# Patient Record
Sex: FEMALE | Race: WHITE | NOT HISPANIC OR LATINO | Employment: FULL TIME
[De-identification: names, ages, dates, MRNs, and addresses within clinical notes are randomized per-mention and may not be internally consistent; named-entity substitution may affect disease eponyms.]

---

## 2017-06-03 ENCOUNTER — HEALTH MAINTENANCE LETTER (OUTPATIENT)
Age: 57
End: 2017-06-03

## 2017-08-29 ENCOUNTER — OFFICE VISIT (OUTPATIENT)
Dept: FAMILY MEDICINE | Facility: CLINIC | Age: 57
End: 2017-08-29
Payer: COMMERCIAL

## 2017-08-29 VITALS
HEIGHT: 67 IN | BODY MASS INDEX: 22.29 KG/M2 | DIASTOLIC BLOOD PRESSURE: 79 MMHG | TEMPERATURE: 98.2 F | WEIGHT: 142 LBS | SYSTOLIC BLOOD PRESSURE: 116 MMHG | HEART RATE: 74 BPM | OXYGEN SATURATION: 97 %

## 2017-08-29 DIAGNOSIS — Z00.00 ROUTINE GENERAL MEDICAL EXAMINATION AT A HEALTH CARE FACILITY: Primary | ICD-10-CM

## 2017-08-29 DIAGNOSIS — Z12.31 ENCOUNTER FOR SCREENING MAMMOGRAM FOR BREAST CANCER: ICD-10-CM

## 2017-08-29 DIAGNOSIS — Z12.11 SPECIAL SCREENING FOR MALIGNANT NEOPLASMS, COLON: ICD-10-CM

## 2017-08-29 DIAGNOSIS — E78.5 HYPERLIPIDEMIA LDL GOAL <130: ICD-10-CM

## 2017-08-29 DIAGNOSIS — Z11.59 ENCOUNTER FOR HEPATITIS C SCREENING TEST FOR LOW RISK PATIENT: ICD-10-CM

## 2017-08-29 LAB
ALBUMIN SERPL-MCNC: 3.9 G/DL (ref 3.4–5)
ALP SERPL-CCNC: 52 U/L (ref 40–150)
ALT SERPL W P-5'-P-CCNC: 21 U/L (ref 0–50)
ANION GAP SERPL CALCULATED.3IONS-SCNC: 9 MMOL/L (ref 3–14)
AST SERPL W P-5'-P-CCNC: 14 U/L (ref 0–45)
BILIRUB SERPL-MCNC: 0.2 MG/DL (ref 0.2–1.3)
BUN SERPL-MCNC: 25 MG/DL (ref 7–30)
CALCIUM SERPL-MCNC: 9.8 MG/DL (ref 8.5–10.1)
CHLORIDE SERPL-SCNC: 110 MMOL/L (ref 94–109)
CHOLEST SERPL-MCNC: 231 MG/DL
CO2 SERPL-SCNC: 25 MMOL/L (ref 20–32)
CREAT SERPL-MCNC: 0.72 MG/DL (ref 0.52–1.04)
ERYTHROCYTE [DISTWIDTH] IN BLOOD BY AUTOMATED COUNT: 12.1 % (ref 10–15)
GFR SERPL CREATININE-BSD FRML MDRD: 84 ML/MIN/1.7M2
GLUCOSE SERPL-MCNC: 97 MG/DL (ref 70–99)
HCT VFR BLD AUTO: 40.5 % (ref 35–47)
HDLC SERPL-MCNC: 62 MG/DL
HGB BLD-MCNC: 13.6 G/DL (ref 11.7–15.7)
LDLC SERPL CALC-MCNC: 161 MG/DL
MCH RBC QN AUTO: 31 PG (ref 26.5–33)
MCHC RBC AUTO-ENTMCNC: 33.6 G/DL (ref 31.5–36.5)
MCV RBC AUTO: 92 FL (ref 78–100)
NONHDLC SERPL-MCNC: 169 MG/DL
PLATELET # BLD AUTO: 232 10E9/L (ref 150–450)
POTASSIUM SERPL-SCNC: 4 MMOL/L (ref 3.4–5.3)
PROT SERPL-MCNC: 6.6 G/DL (ref 6.8–8.8)
RBC # BLD AUTO: 4.39 10E12/L (ref 3.8–5.2)
SODIUM SERPL-SCNC: 144 MMOL/L (ref 133–144)
TRIGL SERPL-MCNC: 40 MG/DL
WBC # BLD AUTO: 4.1 10E9/L (ref 4–11)

## 2017-08-29 PROCEDURE — 85027 COMPLETE CBC AUTOMATED: CPT | Performed by: PHYSICIAN ASSISTANT

## 2017-08-29 PROCEDURE — 80061 LIPID PANEL: CPT | Performed by: PHYSICIAN ASSISTANT

## 2017-08-29 PROCEDURE — 86803 HEPATITIS C AB TEST: CPT | Performed by: PHYSICIAN ASSISTANT

## 2017-08-29 PROCEDURE — 36415 COLL VENOUS BLD VENIPUNCTURE: CPT | Performed by: PHYSICIAN ASSISTANT

## 2017-08-29 PROCEDURE — 80053 COMPREHEN METABOLIC PANEL: CPT | Performed by: PHYSICIAN ASSISTANT

## 2017-08-29 PROCEDURE — 99396 PREV VISIT EST AGE 40-64: CPT | Performed by: PHYSICIAN ASSISTANT

## 2017-08-29 NOTE — MR AVS SNAPSHOT
After Visit Summary   8/29/2017    Henrietta Conner    MRN: [id number]           Patient Information     Date Of Birth          1960        Visit Information        Provider Department      8/29/2017 9:00 AM Tati Oakley PA-C [facility]        Today's Diagnoses     Routine general medical examination at a health care facility    -  1    Hyperlipidemia LDL goal <130        Encounter for hepatitis C screening test for low risk patient        Encounter for screening mammogram for breast cancer        Special screening for malignant neoplasms, colon          Care Instructions      Preventive Health Recommendations  Female Ages 50 - 64    Yearly exam: See your health care provider every year in order to  o Review health changes.   o Discuss preventive care.    o Review your medicines if your doctor has prescribed any.      Get a Pap test every three years (unless you have an abnormal result and your provider advises testing more often).    If you get Pap tests with HPV test, you only need to test every 5 years, unless you have an abnormal result.     You do not need a Pap test if your uterus was removed (hysterectomy) and you have not had cancer.    You should be tested each year for STDs (sexually transmitted diseases) if you're at risk.     Have a mammogram every 1 to 2 years.    Have a colonoscopy at age 50, or have a yearly FIT test (stool test). These exams screen for colon cancer.      Have a cholesterol test every 5 years, or more often if advised.    Have a diabetes test (fasting glucose) every three years. If you are at risk for diabetes, you should have this test more often.     If you are at risk for osteoporosis (brittle bone disease), think about having a bone density scan (DEXA).    Shots: Get a flu shot each year. Get a tetanus shot every 10 years.    Nutrition:     Eat at least 5 servings of fruits and vegetables each day.    Eat whole-grain bread,  "whole-wheat pasta and brown rice instead of white grains and rice.    Talk to your provider about Calcium and Vitamin D.     Lifestyle    Exercise at least 150 minutes a week (30 minutes a day, 5 days a week). This will help you control your weight and prevent disease.    Limit alcohol to one drink per day.    No smoking.     Wear sunscreen to prevent skin cancer.     See your dentist every six months for an exam and cleaning.    See your eye doctor every 1 to 2 years.            Follow-ups after your visit        Who to contact     If you have questions or need follow up information about today's clinic visit or your schedule please contact [facility] directly at [phone].  Normal or non-critical lab and imaging results will be communicated to you by [employer], letter or phone within 4 business days after the clinic has received the results. If you do not hear from us within 7 days, please contact the clinic through [employer] or phone. If you have a critical or abnormal lab result, we will notify you by phone as soon as possible.  Submit refill requests through [employer] or call your pharmacy and they will forward the refill request to us. Please allow 3 business days for your refill to be completed.          Additional Information About Your Visit        MyChart Information     [employer] gives you secure access to your electronic health record. If you see a primary care provider, you can also send messages to your care team and make appointments. If you have questions, please call your primary care clinic.  If you do not have a primary care provider, please call [phone] and they will assist you.        Care EveryWhere ID     This is your Care EveryWhere ID. This could be used by other organizations to access your [facility] medical records  [id number]        Your Vitals Were     Pulse Temperature Height Last Period Pulse Oximetry Breastfeeding?    74 98.2  F (36.8  C) (Oral) 5' 6.5\" (1.689 m) " 06/01/2006 97% No    BMI (Body Mass Index)                   22.58 kg/m2            Blood Pressure from Last 3 Encounters:   08/29/17 116/79   09/02/15 108/68   08/13/14 101/64    Weight from Last 3 Encounters:   08/29/17 142 lb (64.4 kg)   09/02/15 135 lb (61.2 kg)   08/13/14 137 lb (62.1 kg)              Today, you had the following     No orders found for display         Today's Medication Changes          These changes are accurate as of: 8/29/17  9:10 AM.  If you have any questions, ask your nurse or doctor.               Stop taking these medicines if you haven't already. Please contact your care team if you have questions.     cetirizine 10 MG tablet   Commonly known as:  zyrTEC   Stopped by:  Tati Oakley PA-C                    Primary Care Provider Office Phone # Fax #    Tati Oakley PA-C [phone] [address] [facility] [address]        Equal Access to Services     NURA GAONA AH: Hadii yumi morrisono Soomaali, waaxda luqadaha, qaybta kaalmada adeegyada, sandy larios . So [facility] [phone].    ATENCIÓN: Si habla español, tiene a gustafson disposición servicios gratuitos de asistencia lingüística. Llame al [phone].    We comply with applicable federal civil rights laws and [place] laws. We do not discriminate on the basis of race, color, national origin, age, disability sex, sexual orientation or gender identity.            Thank you!     Thank you for choosing [facility]  for your care. Our goal is always to provide you with excellent care. Hearing back from our patients is one way we can continue to improve our services. Please take a few minutes to complete the written survey that you may receive in the mail after your visit with us. Thank you!             Your Updated Medication List - Protect others around you: Learn how to safely use, store and throw away your medicines at www.disposemymeds.org.           This list is accurate as of: 8/29/17  9:10 AM.  Always use your most recent med list.                   Brand Name Dispense Instructions for use Diagnosis    BENADRYL PO      Take 25-50 mg by mouth daily as needed.        BIOTIN PO           EXCEDRIN PO      Take by mouth as needed        fish oil-omega-3 fatty acids 1000 MG capsule      1 capsule daily        GLUCOSAMINE PO      2 tablets daily        MAGNESIUM OXIDE PO           Multi-vitamin Tabs tablet   Generic drug:  multivitamin, therapeutic with minerals      1 tablet daily        TURMERIC PO           VITAMIN D (CHOLECALCIFEROL) PO      Take by mouth daily

## 2017-08-29 NOTE — PROGRESS NOTES
SUBJECTIVE:   CC: Henrietta Conner is an 57 year old woman who presents for preventive health visit.     Healthy Habits:    Do you get at least three servings of calcium containing foods daily (dairy, green leafy vegetables, etc.)? yes    Amount of exercise or daily activities, outside of work: 8,000 steps a day    Problems taking medications regularly No    Medication side effects: No    Have you had an eye exam in the past two years? yes    Do you see a dentist twice per year? yes    Do you have sleep apnea, excessive snoring or daytime drowsiness?no              Today's PHQ-2 Score:   PHQ-2 ( 1999 [employer]) 8/29/2017 9/2/2015   Q1: Little interest or pleasure in doing things 0 0   Q2: Feeling down, depressed or hopeless 0 0   PHQ-2 Score 0 0         Abuse: Current or Past(Physical, Sexual or Emotional)- No  Do you feel safe in your environment - Yes  Social History   Substance Use Topics     Smoking status: Former Smoker     Packs/day: 1.00     Years: 30.00     Types: Cigarettes     Smokeless tobacco: Never Used     Alcohol use No     The patient does not drink >3 drinks per day nor >7 drinks per week.    Reviewed orders with patient.  Reviewed health maintenance and updated orders accordingly - Yes  Labs reviewed in [facility]    Patient over age 50, mutual decision to screen reflected in health maintenance.      Pertinent mammograms are reviewed under the imaging tab.  History of abnormal Pap smear: Status post benign hysterectomy. Health Maintenance and Surgical History updated.    Reviewed and updated as needed this visit by clinical staffTobacco  Allergies  Med Hx  Surg Hx  Fam Hx  Soc Hx        Reviewed and updated as needed this visit by Provider        Past Medical History:   Diagnosis Date     Endometriosis of pelvic peritoneum         ROS:  C: NEGATIVE for fever, chills, change in weight  I: NEGATIVE for worrisome rashes, moles or lesions  E: NEGATIVE for vision changes or irritation  ENT: NEGATIVE  for ear, mouth and throat problems  R: NEGATIVE for significant cough or SOB  B: NEGATIVE for masses, tenderness or discharge  CV: NEGATIVE for chest pain, palpitations or peripheral edema  GI: NEGATIVE for nausea, abdominal pain, heartburn, or change in bowel habits  : NEGATIVE for unusual urinary or vaginal symptoms. No vaginal bleeding.  M: NEGATIVE for significant arthralgias or myalgia  N: NEGATIVE for weakness, dizziness or paresthesias  P: NEGATIVE for changes in mood or affect     OBJECTIVE:   LMP 06/01/2006  EXAM:  GENERAL APPEARANCE: healthy, alert and no distress  EYES: Eyes grossly normal to inspection, PERRL and conjunctivae and sclerae normal  HENT: ear canals and TM's normal, nose and mouth without ulcers or lesions, oropharynx clear and oral mucous membranes moist  NECK: no adenopathy, no asymmetry, masses, or scars and thyroid normal to palpation  RESP: lungs clear to auscultation - no rales, rhonchi or wheezes  BREAST: normal without masses, tenderness or nipple discharge and no palpable axillary masses or adenopathy  CV: regular rate and rhythm, normal S1 S2, no S3 or S4, no murmur, click or rub, no peripheral edema and peripheral pulses strong  ABDOMEN: soft, nontender, no hepatosplenomegaly, no masses and bowel sounds normal  MS: no musculoskeletal defects are noted and gait is age appropriate without ataxia  SKIN: no suspicious lesions or rashes  NEURO: Normal strength and tone, sensory exam grossly normal, mentation intact and speech normal  PSYCH: mentation appears normal and affect normal/bright    ASSESSMENT/PLAN:   1. Routine general medical examination at a health care facility    - CBC with platelets  - Comprehensive metabolic panel  - Lipid panel reflex to direct LDL    2. Hyperlipidemia LDL goal <130    - Lipid panel reflex to direct LDL    3. Encounter for hepatitis C screening test for low risk patient    - Hepatitis C Screen Reflex to HCV RNA Quant and Genotype    4. Encounter for  "screening mammogram for breast cancer    - Fecal colorectal cancer screen (FIT); Future    5. Special screening for malignant neoplasms, colon    - *MA Screening Digital Bilateral; Future    COUNSELING:   Reviewed preventive health counseling, as reflected in patient instructions       Regular exercise       Healthy diet/nutrition       Consider Hep C screening for patients born between 1945 and 1965         reports that she has quit smoking. Her smoking use included Cigarettes. She has a 30.00 pack-year smoking history. She has never used smokeless tobacco.    Estimated body mass index is 21.46 kg/(m^2) as calculated from the following:    Height as of 9/2/15: 5' 6.5\" (1.689 m).    Weight as of 9/2/15: 135 lb (61.2 kg).   Weight management plan noted, stable and monitoring    Counseling Resources:  ATP IV Guidelines  Pooled Cohorts Equation Calculator  Breast Cancer Risk Calculator  FRAX Risk Assessment  ICSI Preventive Guidelines  Dietary Guidelines for Americans, 2010  USDA's MyPlate  ASA Prophylaxis  Lung CA Screening    Tati Oakley PA-C, PAEmelinaC  [facility]"

## 2017-08-30 LAB — HCV AB SERPL QL IA: NONREACTIVE

## 2017-10-03 PROCEDURE — 82274 ASSAY TEST FOR BLOOD FECAL: CPT | Performed by: PHYSICIAN ASSISTANT

## 2017-10-05 DIAGNOSIS — Z12.31 ENCOUNTER FOR SCREENING MAMMOGRAM FOR BREAST CANCER: ICD-10-CM

## 2017-10-05 LAB — HEMOCCULT STL QL IA: NEGATIVE

## 2017-11-30 ENCOUNTER — TELEPHONE (OUTPATIENT)
Dept: FAMILY MEDICINE | Facility: CLINIC | Age: 57
End: 2017-11-30

## 2017-11-30 NOTE — TELEPHONE ENCOUNTER
"11/30/2017    Patient Communication Preferences indicate  Do not contact  and/or communication by \"Phone\" is not preferred. Call not required per Outreach team.      Outreach ,  Opal Iyer     "

## 2018-04-10 ENCOUNTER — TELEPHONE (OUTPATIENT)
Dept: FAMILY MEDICINE | Facility: CLINIC | Age: 58
End: 2018-04-10

## 2018-04-10 NOTE — TELEPHONE ENCOUNTER
Panel Management Review          Composite cancer screening  Chart review shows that this patient is due/due soon for the following Mammogram  Summary:    Patient is due/failing the following:   MAMMOGRAM    Action needed:   Patient needs referral/order: Needs an appointment for a mammogram    Type of outreach:    Phone, spoke to patient.  Appointment for mammogram scheduled for 4/20/18.    Questions for provider review:    None                                                                                                                                    Rneu Flores M.A.

## 2018-04-20 ENCOUNTER — RADIANT APPOINTMENT (OUTPATIENT)
Dept: MAMMOGRAPHY | Facility: CLINIC | Age: 58
End: 2018-04-20
Payer: COMMERCIAL

## 2018-04-20 DIAGNOSIS — Z12.11 SPECIAL SCREENING FOR MALIGNANT NEOPLASMS, COLON: ICD-10-CM

## 2018-04-20 PROCEDURE — 77067 SCR MAMMO BI INCL CAD: CPT | Mod: TC

## 2018-10-23 ENCOUNTER — OFFICE VISIT (OUTPATIENT)
Dept: FAMILY MEDICINE | Facility: CLINIC | Age: 58
End: 2018-10-23
Payer: COMMERCIAL

## 2018-10-23 VITALS
HEIGHT: 67 IN | HEART RATE: 70 BPM | RESPIRATION RATE: 14 BRPM | SYSTOLIC BLOOD PRESSURE: 102 MMHG | DIASTOLIC BLOOD PRESSURE: 70 MMHG | WEIGHT: 137 LBS | BODY MASS INDEX: 21.5 KG/M2 | TEMPERATURE: 98.3 F

## 2018-10-23 DIAGNOSIS — Z00.00 ROUTINE HISTORY AND PHYSICAL EXAMINATION OF ADULT: Primary | ICD-10-CM

## 2018-10-23 DIAGNOSIS — B07.0 PLANTAR WARTS: ICD-10-CM

## 2018-10-23 DIAGNOSIS — Z23 NEED FOR PROPHYLACTIC VACCINATION AND INOCULATION AGAINST INFLUENZA: ICD-10-CM

## 2018-10-23 DIAGNOSIS — Z87.891 PERSONAL HISTORY OF TOBACCO USE: ICD-10-CM

## 2018-10-23 DIAGNOSIS — Z12.2 ENCOUNTER FOR SCREENING FOR LUNG CANCER: ICD-10-CM

## 2018-10-23 LAB
ALBUMIN SERPL-MCNC: 3.8 G/DL (ref 3.4–5)
ALP SERPL-CCNC: 58 U/L (ref 40–150)
ALT SERPL W P-5'-P-CCNC: 21 U/L (ref 0–50)
ANION GAP SERPL CALCULATED.3IONS-SCNC: 7 MMOL/L (ref 3–14)
AST SERPL W P-5'-P-CCNC: 18 U/L (ref 0–45)
BILIRUB SERPL-MCNC: 0.4 MG/DL (ref 0.2–1.3)
BUN SERPL-MCNC: 17 MG/DL (ref 7–30)
CALCIUM SERPL-MCNC: 9.6 MG/DL (ref 8.5–10.1)
CHLORIDE SERPL-SCNC: 108 MMOL/L (ref 94–109)
CHOLEST SERPL-MCNC: 200 MG/DL
CO2 SERPL-SCNC: 26 MMOL/L (ref 20–32)
CREAT SERPL-MCNC: 0.74 MG/DL (ref 0.52–1.04)
ERYTHROCYTE [DISTWIDTH] IN BLOOD BY AUTOMATED COUNT: 12.1 % (ref 10–15)
GFR SERPL CREATININE-BSD FRML MDRD: 80 ML/MIN/1.7M2
GLUCOSE SERPL-MCNC: 93 MG/DL (ref 70–99)
HCT VFR BLD AUTO: 40.3 % (ref 35–47)
HDLC SERPL-MCNC: 63 MG/DL
HGB BLD-MCNC: 13.4 G/DL (ref 11.7–15.7)
LDLC SERPL CALC-MCNC: 126 MG/DL
MCH RBC QN AUTO: 30.5 PG (ref 26.5–33)
MCHC RBC AUTO-ENTMCNC: 33.3 G/DL (ref 31.5–36.5)
MCV RBC AUTO: 92 FL (ref 78–100)
NONHDLC SERPL-MCNC: 137 MG/DL
PLATELET # BLD AUTO: 230 10E9/L (ref 150–450)
POTASSIUM SERPL-SCNC: 4 MMOL/L (ref 3.4–5.3)
PROT SERPL-MCNC: 6.9 G/DL (ref 6.8–8.8)
RBC # BLD AUTO: 4.39 10E12/L (ref 3.8–5.2)
SODIUM SERPL-SCNC: 141 MMOL/L (ref 133–144)
TRIGL SERPL-MCNC: 53 MG/DL
WBC # BLD AUTO: 3.6 10E9/L (ref 4–11)

## 2018-10-23 PROCEDURE — 17110 DESTRUCTION B9 LES UP TO 14: CPT | Performed by: PHYSICIAN ASSISTANT

## 2018-10-23 PROCEDURE — 80053 COMPREHEN METABOLIC PANEL: CPT | Performed by: PHYSICIAN ASSISTANT

## 2018-10-23 PROCEDURE — G0296 VISIT TO DETERM LDCT ELIG: HCPCS | Performed by: PHYSICIAN ASSISTANT

## 2018-10-23 PROCEDURE — 85027 COMPLETE CBC AUTOMATED: CPT | Performed by: PHYSICIAN ASSISTANT

## 2018-10-23 PROCEDURE — 80061 LIPID PANEL: CPT | Performed by: PHYSICIAN ASSISTANT

## 2018-10-23 PROCEDURE — 90682 RIV4 VACC RECOMBINANT DNA IM: CPT | Performed by: PHYSICIAN ASSISTANT

## 2018-10-23 PROCEDURE — 90471 IMMUNIZATION ADMIN: CPT | Performed by: PHYSICIAN ASSISTANT

## 2018-10-23 PROCEDURE — 36415 COLL VENOUS BLD VENIPUNCTURE: CPT | Performed by: PHYSICIAN ASSISTANT

## 2018-10-23 PROCEDURE — 99396 PREV VISIT EST AGE 40-64: CPT | Mod: 25 | Performed by: PHYSICIAN ASSISTANT

## 2018-10-23 ASSESSMENT — ENCOUNTER SYMPTOMS
MYALGIAS: 0
HEARTBURN: 0
JOINT SWELLING: 0
DYSURIA: 0
WEAKNESS: 0
EYE PAIN: 0
HEMATURIA: 0
HEADACHES: 0
BREAST MASS: 0
SHORTNESS OF BREATH: 0
FREQUENCY: 0
NAUSEA: 0
ARTHRALGIAS: 0
CONSTIPATION: 0
ABDOMINAL PAIN: 0
CHILLS: 0
DIARRHEA: 1
HEMATOCHEZIA: 0
COUGH: 0
SORE THROAT: 0
DIZZINESS: 0

## 2018-10-23 NOTE — MR AVS SNAPSHOT
After Visit Summary   10/23/2018    Henrietta Conner    MRN: [id number]           Patient Information     Date Of Birth          1960        Visit Information        Provider Department      10/23/2018 9:00 AM Tati Oakley PA-C [facility]        Today's Diagnoses     Need for prophylactic vaccination and inoculation against influenza    -  1    Routine history and physical examination of adult        Encounter for screening for lung cancer        Personal history of tobacco use          Care Instructions      Preventive Health Recommendations  Female Ages 50 - 64    Yearly exam: See your health care provider every year in order to  o Review health changes.   o Discuss preventive care.    o Review your medicines if your doctor has prescribed any.      Get a Pap test every three years (unless you have an abnormal result and your provider advises testing more often).    If you get Pap tests with HPV test, you only need to test every 5 years, unless you have an abnormal result.     You do not need a Pap test if your uterus was removed (hysterectomy) and you have not had cancer.    You should be tested each year for STDs (sexually transmitted diseases) if you're at risk.     Have a mammogram every 1 to 2 years.    Have a colonoscopy at age 50, or have a yearly FIT test (stool test). These exams screen for colon cancer.      Have a cholesterol test every 5 years, or more often if advised.    Have a diabetes test (fasting glucose) every three years. If you are at risk for diabetes, you should have this test more often.     If you are at risk for osteoporosis (brittle bone disease), think about having a bone density scan (DEXA).    Shots: Get a flu shot each year. Get a tetanus shot every 10 years.    Nutrition:     Eat at least 5 servings of fruits and vegetables each day.    Eat whole-grain bread, whole-wheat pasta and brown rice instead of white grains and rice.    Get  adequate Calcium and Vitamin D.     Lifestyle    Exercise at least 150 minutes a week (30 minutes a day, 5 days a week). This will help you control your weight and prevent disease.    Limit alcohol to one drink per day.    No smoking.     Wear sunscreen to prevent skin cancer.     See your dentist every six months for an exam and cleaning.    See your eye doctor every 1 to 2 years.      Lung Cancer Screening   Frequently Asked Questions  If you are at high-risk for lung cancer, getting screened with low-dose computed tomography (LDCT) every year can help save your life. This handout offers answers to some of the most common questions about lung cancer screening. If you have other questions, please call [phone] ([phone]).     What is it?  Lung cancer screening uses special X-ray technology to create an image of your lung tissue. The exam is quick and easy and takes less than 10 seconds. We don t give you any medicine or use any needles. You can eat before and after the exam. You don t need to change your clothes as long as the clothing on your chest doesn t contain metal. But, you do need to be able to hold your breath for at least 6 seconds during the exam.    What is the goal of lung cancer screening?  The goal of lung cancer screening is to save lives. Many times, lung cancer is not found until a person starts having physical symptoms. Lung cancer screening can help detect lung cancer in the earliest stages when it may be easier to treat.    Who should be screened for lung cancer?  We suggest lung cancer screening for anyone who is at high-risk for lung cancer. You are in the high-risk group if you:      are between the ages of 55 and 79, and    have smoked at least 1 pack of cigarettes a day for 30 or more years, and    still smoke or have quit within the past 15 years.    However, if you have a new cough or shortness of breath, you should talk to your doctor before being screened.    Some  national lung health advocacy groups also recommend screening for people ages 50 to 79 who have smoked an average of 1 pack of cigarettes a day for 20 years. They must also have at least 1 other risk factor for lung cancer, not including exposure to secondhand smoke. Other risk factors are having had cancer in the past, emphysema, pulmonary fibrosis, COPD, a family history of lung cancer, or exposure to certain materials such as arsenic, asbestos, beryllium, cadmium, chromium, diesel fumes, nickel, radon or silica. Your care team can help you know if you have one of these risk factors.     Why does it matter if I have symptoms?  Certain symptoms can be a sign that you have a condition in your lungs that should be checked and treated by your doctor. These symptoms include fever, chest pain, a new or changing cough, shortness of breath that you have never felt before, coughing up blood or unexplained weight loss. Having any of these symptoms can greatly affect the results of lung cancer screening.       Should all smokers get an LDCT lung cancer screening exam?  It depends. Lung cancer screening is for a very specific group of men and women who have a history of heavy smoking over a long period of time (see  Who should be screened for lung cancer  above).  I am in the high-risk group, but have been diagnosed with cancer in the past. Is LDCT lung cancer screening right for me?  In some cases, you should not have LDCT lung screening, such as when your doctor is already following your cancer with CT scan studies. Your doctor will help you decide if LDCT lung screening is right for you.  Do I need to have a screening exam every year?  Yes. If you are in the high-risk group described earlier, you should get an LDCT lung cancer screening exam every year until you are 79, or are no longer willing or able to undergo screening and possible procedures to diagnose and treat lung cancer.  How effective is LDCT at preventing death  from lung cancer?  Studies have shown that LDCT lung cancer screening can lower the risk of death from lung cancer by 20 percent in people who are at high-risk.  What are the risks?  There are some risks and limitations of LDCT lung cancer screening. We want to make sure you understand the risks and benefits, so please let us know if you have any questions. Your doctor may want to talk with you more about these risks.    Radiation exposure: As with any exam that uses radiation, there is a very small increased risk of cancer. The amount of radiation in LDCT is small--about the same amount a person would get from a mammogram. Your doctor orders the exam when he or she feels the potential benefits outweigh the risks.    False negatives: No test is perfect, including LDCT. It is possible that you may have a medical condition, including lung cancer, that is not found during your exam. This is called a false negative result.    False positives and more testing: LDCT very often finds something in the lung that could be cancer, but in fact is not. This is called a false positive result. False positive tests often cause anxiety. To make sure these findings are not cancer, you may need to have more tests. These tests will be done only if you give us permission. Sometimes patients need a treatment that can have side effects, such as a biopsy. For more information on false positives, see  What can I expect from the results?     Findings not related to lung cancer: Your LDCT exam also takes pictures of areas of your body next to your lungs. In a very small number of cases, the CT scan will show an abnormal finding in one of these areas, such as your kidneys, adrenal glands, liver or thyroid. This finding may not be serious, but you may need more tests. Your doctor can help you decide what other tests you may need, if any.  What can I expect from the results?  About 1 out of 4 LDCT exams will find something that may need more  tests. Most of the time, these findings are lung nodules. Lung nodules are very small collections of tissue in the lung. These nodules are very common, and the vast majority--more than 97 percent--are not cancer (benign). Most are normal lymph nodes or small areas of scarring from past infections.  But, if a small lung nodule is found to be cancer, the cancer can be cured more than 90 percent of the time. To know if the nodule is cancer, we may need to get more images before your next yearly screening exam. If the nodule has suspicious features (for example, it is large, has an odd shape or grows over time), we will refer you to a specialist for further testing.  Will my doctor also get the results?  Yes. Your doctor will get a copy of your results.  Is it okay to keep smoking now that there s a cancer screening exam?  No. Tobacco is one of the strongest cancer-causing agents. It causes not only lung cancer, but other cancers and cardiovascular (heart) diseases as well. The damage caused by smoking builds over time. This means that the longer you smoke, the higher your risk of disease. While it is never too late to quit, the sooner you quit, the better.  Where can I find help to quit smoking?  The best way to prevent lung cancer is to stop smoking. If you have already quit smoking, congratulations and keep it up! For help on quitting smoking, please call [employer] at 4-641-867-THEH ([phone]) or the American Cancer Society at 1-430.984.5396 to find local resources near you.  One-on-one health coaching:  If you d prefer to work individually with a health care provider on tobacco cessation, we offer:      Medication Therapy Management:  Our specially trained pharmacists work closely with you and your doctor to help you quit smoking.  Call [phone] or [phone] (toll free).     Can Do: Health coaching offered by [facility].  [email or web address]            Follow-ups after your visit        Future  "tests that were ordered for you today     Open Future Orders        Priority Expected Expires Ordered    CT Chest Lung Cancer Scrn Low Dose wo Routine  10/23/2019 10/23/2018            Who to contact     If you have questions or need follow up information about today's clinic visit or your schedule please contact [facility] directly at [phone].  Normal or non-critical lab and imaging results will be communicated to you by MyChart, letter or phone within 4 business days after the clinic has received the results. If you do not hear from us within 7 days, please contact the clinic through [employer] or phone. If you have a critical or abnormal lab result, we will notify you by phone as soon as possible.  Submit refill requests through [email or web address] or call your pharmacy and they will forward the refill request to us. Please allow 3 business days for your refill to be completed.          Additional Information About Your Visit        [employer] Information     [email or web address] gives you secure access to your electronic health record. If you see a primary care provider, you can also send messages to your care team and make appointments. If you have questions, please call your primary care clinic.  If you do not have a primary care provider, please call [phone] and they will assist you.        Care EveryWhere ID     This is your Care EveryWhere ID. This could be used by other organizations to access your [place] medical records  [id number]        Your Vitals Were     Pulse Temperature Respirations Height Last Period BMI (Body Mass Index)    70 98.3  F (36.8  C) (Oral) 14 5' 7\" (1.702 m) 06/01/2006 21.46 kg/m2       Blood Pressure from Last 3 Encounters:   10/23/18 102/70   08/29/17 116/79   09/02/15 108/68    Weight from Last 3 Encounters:   10/23/18 137 lb (62.1 kg)   08/29/17 142 lb (64.4 kg)   09/02/15 135 lb (61.2 kg)              We Performed the Following     CBC with platelets     Comprehensive metabolic " panel (BMP + Alb, Alk Phos, ALT, AST, Total. Bili, TP)     FLU VACCINE, (RIV4) RECOMBINANT HA  , IM (FluBlok, egg free) [75970]- >18 YRS (FMG recommended  50-64 YRS)     Lipid panel reflex to direct LDL Fasting     Prof Fee: Shared Decision Making Visit for Lung Cancer Screening     Vaccine Administration, Initial [21224]          Today's Medication Changes          These changes are accurate as of 10/23/18  9:29 AM.  If you have any questions, ask your nurse or doctor.               Stop taking these medicines if you haven't already. Please contact your care team if you have questions.     VITAMIN D (CHOLECALCIFEROL) PO   Stopped by:  Tati Oakley PA-C                    Primary Care Provider Office Phone # Fax #    Tati Oakley PA-C [phone] [address] [facility] [address]        Equal Access to Services     [facility]: Hadii yumi garza hadasho Soera, waaxda luqadaha, qaybta kaalmada adeegyada, sandy jim haygianna larios . So [facility] [phone].    ATENCIÓN: Si habla español, tiene a gustafson disposición servicios gratuitos de asistencia lingüística. Aggie al [phone].    We comply with applicable federal civil rights laws and [place] laws. We do not discriminate on the basis of race, color, national origin, age, disability, sex, sexual orientation, or gender identity.            Thank you!     Thank you for choosing [facility]  for your care. Our goal is always to provide you with excellent care. Hearing back from our patients is one way we can continue to improve our services. Please take a few minutes to complete the written survey that you may receive in the mail after your visit with us. Thank you!             Your Updated Medication List - Protect others around you: Learn how to safely use, store and throw away your medicines at www.disposemymeds.org.          This list is accurate as of 10/23/18  9:29 AM.  Always use your most recent  med list.                   Brand Name Dispense Instructions for use Diagnosis    BENADRYL PO      Take 25-50 mg by mouth daily as needed.        BIOTIN PO      Take 2,500 mg by mouth daily        calcium carbonate-vitamin D 600-400 MG-UNIT Chew      Take 1 chew tab by mouth 2 times daily        EXCEDRIN PO      Take by mouth as needed        fish oil-omega-3 fatty acids 1000 MG capsule      2 capsules daily        GLUCOSAMINE PO      2 tablets daily        MAGNESIUM OXIDE PO      Take 500 mg by mouth daily        Multi-vitamin Tabs tablet   Generic drug:  multivitamin, therapeutic with minerals      1 tablet daily        TURMERIC PO

## 2018-10-23 NOTE — PROGRESS NOTES
SUBJECTIVE:   CC: Henrietta Conner is an 58 year old woman who presents for preventive health visit.     Physical   Annual:     Getting at least 3 servings of Calcium per day:  Yes    Bi-annual eye exam:  Yes    Dental care twice a year:  Yes    Sleep apnea or symptoms of sleep apnea:  None    Diet:  Carbohydrate counting    Frequency of exercise:  2-3 days/week    Duration of exercise:  15-30 minutes    Taking medications regularly:  Yes    Medication side effects:  Muscle aches    Additional concerns today:  YES      Wart --left plantar foot. Requesting freezing today.  Has been there for 6 months, no improvment      Today's PHQ-2 Score:   PHQ-2 ( 1999 [employer]) 10/23/2018   Q1: Little interest or pleasure in doing things 0   Q2: Feeling down, depressed or hopeless 0   PHQ-2 Score 0   Q1: Little interest or pleasure in doing things Not at all   Q2: Feeling down, depressed or hopeless Not at all   PHQ-2 Score 0       Abuse: Current or Past(Physical, Sexual or Emotional)- No  Do you feel safe in your environment - Yes    Social History   Substance Use Topics     Smoking status: Former Smoker     Packs/day: 1.00     Years: 30.00     Types: Cigarettes     Smokeless tobacco: Never Used     Alcohol use No     Alcohol Use 10/23/2018   If you drink alcohol do you typically have greater than 3 drinks per day OR greater than 7 drinks per week? No   No flowsheet data found.    Reviewed orders with patient.  Reviewed health maintenance and updated orders accordingly - Yes  Labs reviewed in [facility]    Patient over age 50, mutual decision to screen reflected in health maintenance.    Pertinent mammograms are reviewed under the imaging tab.  History of abnormal Pap smear: Status post benign hysterectomy. Health Maintenance and Surgical History updated.  PAP / HPV 10/8/2010 9/1/2009 8/4/2008   PAP NIL NIL NIL     Reviewed and updated as needed this visit by clinical staff  Tobacco  Meds  Med Hx  Surg Hx  Fam Hx  Soc Hx     "    Reviewed and updated as needed this visit by Provider        Past Medical History:   Diagnosis Date     Endometriosis of pelvic peritoneum         Review of Systems   Constitutional: Negative for chills.   HENT: Negative for congestion, ear pain, hearing loss and sore throat.    Eyes: Negative for pain and visual disturbance.   Respiratory: Negative for cough and shortness of breath.    Cardiovascular: Negative for chest pain and peripheral edema.   Gastrointestinal: Positive for diarrhea. Negative for abdominal pain, constipation, heartburn, hematochezia and nausea.   Breasts:  Negative for tenderness, breast mass and discharge.   Genitourinary: Negative for dysuria, frequency, genital sores, hematuria, pelvic pain, urgency, vaginal bleeding and vaginal discharge.   Musculoskeletal: Negative for arthralgias, joint swelling and myalgias.   Skin: Negative for rash.   Neurological: Negative for dizziness, weakness and headaches.   Psychiatric/Behavioral: Negative for mood changes.     CONSTITUTIONAL: NEGATIVE for fever, chills, change in weight  INTEGUMENTARY/SKIN: NEGATIVE for worrisome rashes, moles or lesions  EYES: NEGATIVE for vision changes or irritation  ENT: NEGATIVE for ear, mouth and throat problems  RESP: NEGATIVE for significant cough or SOB  BREAST: NEGATIVE for masses, tenderness or discharge  CV: NEGATIVE for chest pain, palpitations or peripheral edema  GI: NEGATIVE for nausea, abdominal pain, heartburn, or change in bowel habits  : NEGATIVE for unusual urinary or vaginal symptoms. No vaginal bleeding.  MUSCULOSKELETAL: NEGATIVE for significant arthralgias or myalgia  NEURO: NEGATIVE for weakness, dizziness or paresthesias  PSYCHIATRIC: NEGATIVE for changes in mood or affect      OBJECTIVE:   /70  Pulse 70  Temp 98.3  F (36.8  C) (Oral)  Resp 14  Ht 5' 7\" (1.702 m)  Wt 137 lb (62.1 kg)  LMP 06/01/2006  BMI 21.46 kg/m2  Physical Exam  GENERAL: healthy, alert and no distress  EYES: " Eyes grossly normal to inspection, PERRL and conjunctivae and sclerae normal  HENT: ear canals and TM's normal, nose and mouth without ulcers or lesions  NECK: no adenopathy, no asymmetry, masses, or scars and thyroid normal to palpation  RESP: lungs clear to auscultation - no rales, rhonchi or wheezes  BREAST: normal without masses, tenderness or nipple discharge and no palpable axillary masses or adenopathy  CV: regular rate and rhythm, normal S1 S2, no S3 or S4, no murmur, click or rub, no peripheral edema and peripheral pulses strong  ABDOMEN: soft, nontender, no hepatosplenomegaly, no masses and bowel sounds normal  MS: no gross musculoskeletal defects noted, no edema  SKIN: left plantar foot with 1 war  NEURO: Normal strength and tone, mentation intact and speech normal  PSYCH: mentation appears normal, affect normal/bright    Verbal consent obtained. Wart pared down with #15 blade then liquid nitrogen applied to wart with 15 second freeze-thaw cycle. Patient tolerated procedure well.           ASSESSMENT/PLAN:   1. Routine history and physical examination of adult  Await labs. Fasting.   - CBC with platelets  - Comprehensive metabolic panel (BMP + Alb, Alk Phos, ALT, AST, Total. Bili, TP)  - Lipid panel reflex to direct LDL Fasting    2. Need for prophylactic vaccination and inoculation against influenza    - FLU VACCINE, (RIV4) RECOMBINANT HA  , IM (FluBlok, egg free) [68270]- >18 YRS (FMG recommended  50-64 YRS)  - Vaccine Administration, Initial [29881]    3. Encounter for screening for lung cancer    - Prof Fee: Shared Decision Making Visit for Lung Cancer Screening  - CT Chest Lung Cancer Scrn Low Dose wo; Future    4. Personal history of tobacco use    - Prof Fee: Shared Decision Making Visit for Lung Cancer Screening  - CT Chest Lung Cancer Scrn Low Dose wo; Future    5. Plantar warts  Wart Home Care:    1.)  Soak wart in warm water for 5 minutes until a little soft. Recommend doing this in the evening  "or before bed.   2.)  File wart, fairly aggressively, with emery board.  3.)  Apply Compound W gel/liquid to area, allow to dry.  4.)  Cover with duct tape and remove tape in the morning.    Repeat treatments nightly. Return to the clinic in 2 weeks if the wart persists.       - TRIM HYPERKERATOTIC SKIN LESION, ONE  - DESTRUCT BENIGN LESION, UP TO 14    COUNSELING:  Reviewed preventive health counseling, as reflected in patient instructions       Regular exercise       Healthy diet/nutrition       Vision screening       Hearing screening       Immunizations    Vaccinated for: Influenza             HIV screeninx in teen years, 1x in adult years, and at intervals if high risk       Consider lung cancer screening for ages 55-80 years and 30 pack-year smoking history     BP Readings from Last 1 Encounters:   10/23/18 102/70     Estimated body mass index is 21.46 kg/(m^2) as calculated from the following:    Height as of this encounter: 5' 7\" (1.702 m).    Weight as of this encounter: 137 lb (62.1 kg).      Weight management plan noted, stable and monitoring     reports that she has quit smoking. Her smoking use included Cigarettes. She has a 30.00 pack-year smoking history. She has never used smokeless tobacco.      Counseling Resources:  ATP IV Guidelines  Pooled Cohorts Equation Calculator  Breast Cancer Risk Calculator  FRAX Risk Assessment  ICSI Preventive Guidelines  Dietary Guidelines for Americans,   USDA's MyPlate  ASA Prophylaxis  Lung CA Screening    Tati Oakley PA-C  [facility]  Answers for HPI/ROS submitted by the patient on 10/23/2018   PHQ-2 Score: 0      Injectable Influenza Immunization Documentation    1.  Is the person to be vaccinated sick today?   No    2. Does the person to be vaccinated have an allergy to a component   of the vaccine?   No  Egg Allergy Algorithm Link    3. Has the person to be vaccinated ever had a serious reaction   to influenza vaccine in the " past?   No    4. Has the person to be vaccinated ever had Guillain-Barré syndrome?   No    Form completed by .Marvin FOY MA

## 2018-10-23 NOTE — PATIENT INSTRUCTIONS
Preventive Health Recommendations  Female Ages 50 - 64    Yearly exam: See your health care provider every year in order to  o Review health changes.   o Discuss preventive care.    o Review your medicines if your doctor has prescribed any.      Get a Pap test every three years (unless you have an abnormal result and your provider advises testing more often).    If you get Pap tests with HPV test, you only need to test every 5 years, unless you have an abnormal result.     You do not need a Pap test if your uterus was removed (hysterectomy) and you have not had cancer.    You should be tested each year for STDs (sexually transmitted diseases) if you're at risk.     Have a mammogram every 1 to 2 years.    Have a colonoscopy at age 50, or have a yearly FIT test (stool test). These exams screen for colon cancer.      Have a cholesterol test every 5 years, or more often if advised.    Have a diabetes test (fasting glucose) every three years. If you are at risk for diabetes, you should have this test more often.     If you are at risk for osteoporosis (brittle bone disease), think about having a bone density scan (DEXA).    Shots: Get a flu shot each year. Get a tetanus shot every 10 years.    Nutrition:     Eat at least 5 servings of fruits and vegetables each day.    Eat whole-grain bread, whole-wheat pasta and brown rice instead of white grains and rice.    Get adequate Calcium and Vitamin D.     Lifestyle    Exercise at least 150 minutes a week (30 minutes a day, 5 days a week). This will help you control your weight and prevent disease.    Limit alcohol to one drink per day.    No smoking.     Wear sunscreen to prevent skin cancer.     See your dentist every six months for an exam and cleaning.    See your eye doctor every 1 to 2 years.      Lung Cancer Screening   Frequently Asked Questions  If you are at high-risk for lung cancer, getting screened with low-dose computed tomography (LDCT) every year can help save  your life. This handout offers answers to some of the most common questions about lung cancer screening. If you have other questions, please call [phone] ([phone]).     What is it?  Lung cancer screening uses special X-ray technology to create an image of your lung tissue. The exam is quick and easy and takes less than 10 seconds. We don t give you any medicine or use any needles. You can eat before and after the exam. You don t need to change your clothes as long as the clothing on your chest doesn t contain metal. But, you do need to be able to hold your breath for at least 6 seconds during the exam.    What is the goal of lung cancer screening?  The goal of lung cancer screening is to save lives. Many times, lung cancer is not found until a person starts having physical symptoms. Lung cancer screening can help detect lung cancer in the earliest stages when it may be easier to treat.    Who should be screened for lung cancer?  We suggest lung cancer screening for anyone who is at high-risk for lung cancer. You are in the high-risk group if you:      are between the ages of 55 and 79, and    have smoked at least 1 pack of cigarettes a day for 30 or more years, and    still smoke or have quit within the past 15 years.    However, if you have a new cough or shortness of breath, you should talk to your doctor before being screened.    Some national lung health advocacy groups also recommend screening for people ages 50 to 79 who have smoked an average of 1 pack of cigarettes a day for 20 years. They must also have at least 1 other risk factor for lung cancer, not including exposure to secondhand smoke. Other risk factors are having had cancer in the past, emphysema, pulmonary fibrosis, COPD, a family history of lung cancer, or exposure to certain materials such as arsenic, asbestos, beryllium, cadmium, chromium, diesel fumes, nickel, radon or silica. Your care team can help you know if you have one of  these risk factors.     Why does it matter if I have symptoms?  Certain symptoms can be a sign that you have a condition in your lungs that should be checked and treated by your doctor. These symptoms include fever, chest pain, a new or changing cough, shortness of breath that you have never felt before, coughing up blood or unexplained weight loss. Having any of these symptoms can greatly affect the results of lung cancer screening.       Should all smokers get an LDCT lung cancer screening exam?  It depends. Lung cancer screening is for a very specific group of men and women who have a history of heavy smoking over a long period of time (see  Who should be screened for lung cancer  above).  I am in the high-risk group, but have been diagnosed with cancer in the past. Is LDCT lung cancer screening right for me?  In some cases, you should not have LDCT lung screening, such as when your doctor is already following your cancer with CT scan studies. Your doctor will help you decide if LDCT lung screening is right for you.  Do I need to have a screening exam every year?  Yes. If you are in the high-risk group described earlier, you should get an LDCT lung cancer screening exam every year until you are 79, or are no longer willing or able to undergo screening and possible procedures to diagnose and treat lung cancer.  How effective is LDCT at preventing death from lung cancer?  Studies have shown that LDCT lung cancer screening can lower the risk of death from lung cancer by 20 percent in people who are at high-risk.  What are the risks?  There are some risks and limitations of LDCT lung cancer screening. We want to make sure you understand the risks and benefits, so please let us know if you have any questions. Your doctor may want to talk with you more about these risks.    Radiation exposure: As with any exam that uses radiation, there is a very small increased risk of cancer. The amount of radiation in LDCT is  small--about the same amount a person would get from a mammogram. Your doctor orders the exam when he or she feels the potential benefits outweigh the risks.    False negatives: No test is perfect, including LDCT. It is possible that you may have a medical condition, including lung cancer, that is not found during your exam. This is called a false negative result.    False positives and more testing: LDCT very often finds something in the lung that could be cancer, but in fact is not. This is called a false positive result. False positive tests often cause anxiety. To make sure these findings are not cancer, you may need to have more tests. These tests will be done only if you give us permission. Sometimes patients need a treatment that can have side effects, such as a biopsy. For more information on false positives, see  What can I expect from the results?     Findings not related to lung cancer: Your LDCT exam also takes pictures of areas of your body next to your lungs. In a very small number of cases, the CT scan will show an abnormal finding in one of these areas, such as your kidneys, adrenal glands, liver or thyroid. This finding may not be serious, but you may need more tests. Your doctor can help you decide what other tests you may need, if any.  What can I expect from the results?  About 1 out of 4 LDCT exams will find something that may need more tests. Most of the time, these findings are lung nodules. Lung nodules are very small collections of tissue in the lung. These nodules are very common, and the vast majority--more than 97 percent--are not cancer (benign). Most are normal lymph nodes or small areas of scarring from past infections.  But, if a small lung nodule is found to be cancer, the cancer can be cured more than 90 percent of the time. To know if the nodule is cancer, we may need to get more images before your next yearly screening exam. If the nodule has suspicious features (for example, it  is large, has an odd shape or grows over time), we will refer you to a specialist for further testing.  Will my doctor also get the results?  Yes. Your doctor will get a copy of your results.  Is it okay to keep smoking now that there s a cancer screening exam?  No. Tobacco is one of the strongest cancer-causing agents. It causes not only lung cancer, but other cancers and cardiovascular (heart) diseases as well. The damage caused by smoking builds over time. This means that the longer you smoke, the higher your risk of disease. While it is never too late to quit, the sooner you quit, the better.  Where can I find help to quit smoking?  The best way to prevent lung cancer is to stop smoking. If you have already quit smoking, congratulations and keep it up! For help on quitting smoking, please call [employer] at 0-308-717-PTXW ([phone] or the American [employer] Society at 1-492.163.3952 to find local resources near you.  One-on-one health coaching:  If you d prefer to work individually with a health care provider on tobacco cessation, we offer:      Medication Therapy Management:  Our specially trained pharmacists work closely with you and your doctor to help you quit smoking.  Call [phone] or [phone] (toll free).     Can Do: Health coaching offered by [facility].  [email or web address]

## 2018-10-23 NOTE — PROGRESS NOTES
Lung Cancer Screening Shared Decision Making Visit     Henrietta Conner is eligible for lung cancer screening on the basis of the information provided in my signed lung cancer screening order.     I have discussed with patient the risks and benefits of screening for lung cancer with low-dose CT.     The risks include:  radiation exposure: one low dose chest CT has as much ionizing radiation as about 15 chest x-rays or 6 months of background radiation living in [place]    false positives: 96% of positive findings/nodules are NOT cancer, but some might still require additional diagnostic evaluation, including biopsy  over-diagnosis: some slow growing cancers that might never have been clinically significant will be detected and treated unnecessarily     The benefit of early detection of lung cancer is contingent upon adherence to annual screening or more frequent follow up if indicated.     Furthermore, reaping the benefits of screening requires Henrietta Conner to be willing and physically able to undergo diagnostic procedures, if indicated. Although no specific guide is available for determining severity of comorbidities, it is reasonable to withhold screening in patients who have greater mortality risk from other diseases.     We did discuss that the only way to prevent lung cancer is to not smoke. Smoking cessation assistance was not offered.    I did offer risk estimation using a calculator such as this one:    ShouldIScreen

## 2018-10-31 ENCOUNTER — HOSPITAL ENCOUNTER (OUTPATIENT)
Dept: CT IMAGING | Facility: CLINIC | Age: 58
Discharge: HOME | End: 2018-10-31
Attending: PHYSICIAN ASSISTANT | Admitting: PHYSICIAN ASSISTANT
Payer: COMMERCIAL

## 2018-10-31 DIAGNOSIS — Z12.2 ENCOUNTER FOR SCREENING FOR LUNG CANCER: ICD-10-CM

## 2018-10-31 DIAGNOSIS — Z87.891 PERSONAL HISTORY OF TOBACCO USE: ICD-10-CM

## 2018-10-31 PROCEDURE — G0297 LDCT FOR LUNG CA SCREEN: HCPCS

## 2019-02-19 ENCOUNTER — TELEPHONE (OUTPATIENT)
Dept: FAMILY MEDICINE | Facility: CLINIC | Age: 59
End: 2019-02-19

## 2019-02-19 DIAGNOSIS — Z12.11 SPECIAL SCREENING FOR MALIGNANT NEOPLASMS, COLON: Primary | ICD-10-CM

## 2019-02-19 NOTE — TELEPHONE ENCOUNTER
Panel Management Review      Patient has the following on her problem list: None      Composite cancer screening  Chart review shows that this patient is due/due soon for the following Colonoscopy or Fecal Colorectal (FIT)  Summary:    Patient is due/failing the following:   COLONOSCOPY or FIT    Action needed:   Patient needs referral/order: FIT test or Colonoscopy    Type of outreach:    Panel Pool, please contact patient    Questions for provider review:    None                                                                                                                                    Michel Lazo MA       Chart routed to Care Team .

## 2019-03-26 PROCEDURE — 82274 ASSAY TEST FOR BLOOD FECAL: CPT | Performed by: PHYSICIAN ASSISTANT

## 2019-03-31 LAB — HEMOCCULT STL QL IA: NEGATIVE

## 2019-04-01 DIAGNOSIS — Z12.11 SPECIAL SCREENING FOR MALIGNANT NEOPLASMS, COLON: ICD-10-CM

## 2019-12-09 ENCOUNTER — HEALTH MAINTENANCE LETTER (OUTPATIENT)
Age: 59
End: 2019-12-09

## 2019-12-19 ENCOUNTER — OFFICE VISIT (OUTPATIENT)
Dept: FAMILY MEDICINE | Facility: CLINIC | Age: 59
End: 2019-12-19
Payer: COMMERCIAL

## 2019-12-19 VITALS
WEIGHT: 140 LBS | SYSTOLIC BLOOD PRESSURE: 123 MMHG | DIASTOLIC BLOOD PRESSURE: 82 MMHG | HEART RATE: 85 BPM | OXYGEN SATURATION: 96 % | TEMPERATURE: 98.5 F | RESPIRATION RATE: 16 BRPM | BODY MASS INDEX: 21.93 KG/M2

## 2019-12-19 DIAGNOSIS — Z00.00 ROUTINE GENERAL MEDICAL EXAMINATION AT A HEALTH CARE FACILITY: Primary | ICD-10-CM

## 2019-12-19 DIAGNOSIS — Z23 NEED FOR SHINGLES VACCINE: ICD-10-CM

## 2019-12-19 DIAGNOSIS — Z12.11 SPECIAL SCREENING FOR MALIGNANT NEOPLASMS, COLON: ICD-10-CM

## 2019-12-19 DIAGNOSIS — Z12.31 ENCOUNTER FOR SCREENING MAMMOGRAM FOR BREAST CANCER: ICD-10-CM

## 2019-12-19 DIAGNOSIS — Z23 INFLUENZA VACCINE NEEDED: ICD-10-CM

## 2019-12-19 DIAGNOSIS — Z11.4 SCREENING FOR HIV (HUMAN IMMUNODEFICIENCY VIRUS): ICD-10-CM

## 2019-12-19 DIAGNOSIS — Z87.891 PERSONAL HISTORY OF TOBACCO USE: ICD-10-CM

## 2019-12-19 LAB
ALBUMIN SERPL-MCNC: 3.8 G/DL (ref 3.4–5)
ALP SERPL-CCNC: 58 U/L (ref 40–150)
ALT SERPL W P-5'-P-CCNC: 25 U/L (ref 0–50)
ANION GAP SERPL CALCULATED.3IONS-SCNC: 6 MMOL/L (ref 3–14)
AST SERPL W P-5'-P-CCNC: 13 U/L (ref 0–45)
BILIRUB SERPL-MCNC: 0.3 MG/DL (ref 0.2–1.3)
BUN SERPL-MCNC: 21 MG/DL (ref 7–30)
CALCIUM SERPL-MCNC: 9.9 MG/DL (ref 8.5–10.1)
CHLORIDE SERPL-SCNC: 109 MMOL/L (ref 94–109)
CHOLEST SERPL-MCNC: 225 MG/DL
CO2 SERPL-SCNC: 26 MMOL/L (ref 20–32)
CREAT SERPL-MCNC: 0.74 MG/DL (ref 0.52–1.04)
ERYTHROCYTE [DISTWIDTH] IN BLOOD BY AUTOMATED COUNT: 12.4 % (ref 10–15)
GFR SERPL CREATININE-BSD FRML MDRD: 89 ML/MIN/{1.73_M2}
GLUCOSE SERPL-MCNC: 98 MG/DL (ref 70–99)
HCT VFR BLD AUTO: 40.3 % (ref 35–47)
HDLC SERPL-MCNC: 56 MG/DL
HGB BLD-MCNC: 13.1 G/DL (ref 11.7–15.7)
LDLC SERPL CALC-MCNC: 152 MG/DL
MCH RBC QN AUTO: 30 PG (ref 26.5–33)
MCHC RBC AUTO-ENTMCNC: 32.5 G/DL (ref 31.5–36.5)
MCV RBC AUTO: 92 FL (ref 78–100)
NONHDLC SERPL-MCNC: 169 MG/DL
PLATELET # BLD AUTO: 234 10E9/L (ref 150–450)
POTASSIUM SERPL-SCNC: 3.9 MMOL/L (ref 3.4–5.3)
PROT SERPL-MCNC: 6.5 G/DL (ref 6.8–8.8)
RBC # BLD AUTO: 4.37 10E12/L (ref 3.8–5.2)
SODIUM SERPL-SCNC: 141 MMOL/L (ref 133–144)
TRIGL SERPL-MCNC: 87 MG/DL
WBC # BLD AUTO: 4.1 10E9/L (ref 4–11)

## 2019-12-19 PROCEDURE — 99396 PREV VISIT EST AGE 40-64: CPT | Mod: 25 | Performed by: PHYSICIAN ASSISTANT

## 2019-12-19 PROCEDURE — 85027 COMPLETE CBC AUTOMATED: CPT | Performed by: PHYSICIAN ASSISTANT

## 2019-12-19 PROCEDURE — 90682 RIV4 VACC RECOMBINANT DNA IM: CPT | Performed by: PHYSICIAN ASSISTANT

## 2019-12-19 PROCEDURE — 90472 IMMUNIZATION ADMIN EACH ADD: CPT | Performed by: PHYSICIAN ASSISTANT

## 2019-12-19 PROCEDURE — 87389 HIV-1 AG W/HIV-1&-2 AB AG IA: CPT | Performed by: PHYSICIAN ASSISTANT

## 2019-12-19 PROCEDURE — 90750 HZV VACC RECOMBINANT IM: CPT | Performed by: PHYSICIAN ASSISTANT

## 2019-12-19 PROCEDURE — 80061 LIPID PANEL: CPT | Performed by: PHYSICIAN ASSISTANT

## 2019-12-19 PROCEDURE — 80053 COMPREHEN METABOLIC PANEL: CPT | Performed by: PHYSICIAN ASSISTANT

## 2019-12-19 PROCEDURE — 36415 COLL VENOUS BLD VENIPUNCTURE: CPT | Performed by: PHYSICIAN ASSISTANT

## 2019-12-19 PROCEDURE — G0296 VISIT TO DETERM LDCT ELIG: HCPCS | Performed by: PHYSICIAN ASSISTANT

## 2019-12-19 PROCEDURE — 90471 IMMUNIZATION ADMIN: CPT | Performed by: PHYSICIAN ASSISTANT

## 2019-12-19 SDOH — SOCIAL STABILITY: SOCIAL NETWORK
DO YOU BELONG TO ANY CLUBS OR ORGANIZATIONS SUCH AS CHURCH GROUPS UNIONS, FRATERNAL OR ATHLETIC GROUPS, OR SCHOOL GROUPS?: YES

## 2019-12-19 SDOH — HEALTH STABILITY: MENTAL HEALTH: HOW MANY STANDARD DRINKS CONTAINING ALCOHOL DO YOU HAVE ON A TYPICAL DAY?: PATIENT DECLINED

## 2019-12-19 SDOH — SOCIAL STABILITY: SOCIAL NETWORK: IN A TYPICAL WEEK, HOW MANY TIMES DO YOU TALK ON THE PHONE WITH FAMILY, FRIENDS, OR NEIGHBORS?: ONCE A WEEK

## 2019-12-19 SDOH — HEALTH STABILITY: MENTAL HEALTH: HOW OFTEN DO YOU HAVE 6 OR MORE DRINKS ON ONE OCCASION?: NEVER

## 2019-12-19 SDOH — ECONOMIC STABILITY: INCOME INSECURITY: HOW HARD IS IT FOR YOU TO PAY FOR THE VERY BASICS LIKE FOOD, HOUSING, MEDICAL CARE, AND HEATING?: NOT VERY HARD

## 2019-12-19 SDOH — SOCIAL STABILITY: SOCIAL NETWORK: ARE YOU MARRIED, WIDOWED, DIVORCED, SEPARATED, NEVER MARRIED, OR LIVING WITH A PARTNER?: MARRIED

## 2019-12-19 SDOH — ECONOMIC STABILITY: TRANSPORTATION INSECURITY
IN THE PAST 12 MONTHS, HAS LACK OF TRANSPORTATION KEPT YOU FROM MEETINGS, WORK, OR FROM GETTING THINGS NEEDED FOR DAILY LIVING?: NO

## 2019-12-19 SDOH — HEALTH STABILITY: MENTAL HEALTH: HOW OFTEN DO YOU HAVE A DRINK CONTAINING ALCOHOL?: NEVER

## 2019-12-19 SDOH — SOCIAL STABILITY: SOCIAL NETWORK: HOW OFTEN DO YOU GET TOGETHER WITH FRIENDS OR RELATIVES?: ONCE A WEEK

## 2019-12-19 SDOH — ECONOMIC STABILITY: TRANSPORTATION INSECURITY
IN THE PAST 12 MONTHS, HAS THE LACK OF TRANSPORTATION KEPT YOU FROM MEDICAL APPOINTMENTS OR FROM GETTING MEDICATIONS?: NO

## 2019-12-19 SDOH — HEALTH STABILITY: PHYSICAL HEALTH: ON AVERAGE, HOW MANY DAYS PER WEEK DO YOU ENGAGE IN MODERATE TO STRENUOUS EXERCISE (LIKE A BRISK WALK)?: 0 DAYS

## 2019-12-19 SDOH — SOCIAL STABILITY: SOCIAL NETWORK: HOW OFTEN DO YOU ATTEND CHURCH OR RELIGIOUS SERVICES?: 1 TO 4 TIMES PER YEAR

## 2019-12-19 SDOH — SOCIAL STABILITY: SOCIAL NETWORK: HOW OFTEN DO YOU ATTENT MEETINGS OF THE CLUB OR ORGANIZATION YOU BELONG TO?: MORE THAN 4 TIMES PER YEAR

## 2019-12-19 SDOH — HEALTH STABILITY: PHYSICAL HEALTH: ON AVERAGE, HOW MANY MINUTES DO YOU ENGAGE IN EXERCISE AT THIS LEVEL?: 0 MIN

## 2019-12-19 SDOH — HEALTH STABILITY: MENTAL HEALTH
STRESS IS WHEN SOMEONE FEELS TENSE, NERVOUS, ANXIOUS, OR CAN'T SLEEP AT NIGHT BECAUSE THEIR MIND IS TROUBLED. HOW STRESSED ARE YOU?: ONLY A LITTLE

## 2019-12-19 SDOH — ECONOMIC STABILITY: FOOD INSECURITY: WITHIN THE PAST 12 MONTHS, YOU WORRIED THAT YOUR FOOD WOULD RUN OUT BEFORE YOU GOT MONEY TO BUY MORE.: NEVER TRUE

## 2019-12-19 SDOH — ECONOMIC STABILITY: FOOD INSECURITY: WITHIN THE PAST 12 MONTHS, THE FOOD YOU BOUGHT JUST DIDN'T LAST AND YOU DIDN'T HAVE MONEY TO GET MORE.: NEVER TRUE

## 2019-12-19 ASSESSMENT — ENCOUNTER SYMPTOMS
EYE PAIN: 0
COUGH: 0
HEMATOCHEZIA: 0
NERVOUS/ANXIOUS: 0
CHILLS: 0
ABDOMINAL PAIN: 0
DIARRHEA: 0
DIZZINESS: 0
CONSTIPATION: 0
HEMATURIA: 0
FEVER: 0

## 2019-12-19 NOTE — PROGRESS NOTES
SUBJECTIVE:   CC: Henrietta Conner is an 59 year old woman who presents for preventive health visit.     Healthy Habits:     Getting at least 3 servings of Calcium per day:  Yes    Bi-annual eye exam:  Yes    Dental care twice a year:  Yes    Sleep apnea or symptoms of sleep apnea:  None    Diet:  Carbohydrate counting    Frequency of exercise:  6-7 days/week    Duration of exercise:  15-30 minutes    Taking medications regularly:  Yes    Medication side effects:  Not applicable    PHQ-2 Total Score: 0    Additional concerns today:  No        Today's PHQ-2 Score:   PHQ-2 ( 1999 [employer]) 12/19/2019   Q1: Little interest or pleasure in doing things 0   Q2: Feeling down, depressed or hopeless 0   PHQ-2 Score 0   Q1: Little interest or pleasure in doing things Not at all   Q2: Feeling down, depressed or hopeless Not at all   PHQ-2 Score 0       Abuse: Current or Past(Physical, Sexual or Emotional)- No  Do you feel safe in your environment? Yes    Have you ever done Advance Care Planning? (For example, a Health Directive, POLST, or a discussion with a medical provider or your loved ones about your wishes): Yes, advance care planning is on file.    Social History     Tobacco Use     Smoking status: Former Smoker     Packs/day: 1.00     Years: 30.00     Pack years: 30.00     Types: Cigarettes     Smokeless tobacco: Never Used   Substance Use Topics     Alcohol use: No     Frequency: Never     Drinks per session: Patient refused     Binge frequency: Never     If you drink alcohol do you typically have >3 drinks per day or >7 drinks per week? No    Alcohol Use 12/19/2019   Prescreen: >3 drinks/day or >7 drinks/week? Not Applicable   Prescreen: >3 drinks/day or >7 drinks/week? -       Reviewed orders with patient.  Reviewed health maintenance and updated orders accordingly - Yes  Lab work is in process    Mammogram Screening: Patient over age 50, mutual decision to screen reflected in health maintenance.    Pertinent  mammograms are reviewed under the imaging tab.  History of abnormal Pap smear: NO - age 30-65 PAP every 5 years with negative HPV co-testing recommended  PAP / HPV 10/8/2010 9/1/2009 8/4/2008   PAP NIL NIL NIL     Reviewed and updated as needed this visit by clinical staff  Tobacco  Allergies  Meds  Med Hx  Surg Hx  Fam Hx  Soc Hx        Reviewed and updated as needed this visit by Provider        Past Medical History:   Diagnosis Date     Endometriosis of pelvic peritoneum         Review of Systems   Constitutional: Negative for chills and fever.   HENT: Negative for congestion and ear pain.    Eyes: Negative for pain.   Respiratory: Negative for cough.    Cardiovascular: Negative for chest pain.   Gastrointestinal: Negative for abdominal pain, constipation, diarrhea and hematochezia.   Genitourinary: Negative for hematuria.   Neurological: Negative for dizziness.   Psychiatric/Behavioral: The patient is not nervous/anxious.           OBJECTIVE:   LMP 06/01/2006   Physical Exam  GENERAL APPEARANCE: healthy, alert and no distress  EYES: Eyes grossly normal to inspection, PERRL and conjunctivae and sclerae normal  HENT: ear canals and TM's normal, nose and mouth without ulcers or lesions, oropharynx clear and oral mucous membranes moist  NECK: no adenopathy, no asymmetry, masses, or scars and thyroid normal to palpation  RESP: lungs clear to auscultation - no rales, rhonchi or wheezes  BREAST: normal without masses, tenderness or nipple discharge and no palpable axillary masses or adenopathy  CV: regular rate and rhythm, normal S1 S2, no S3 or S4, no murmur, click or rub, no peripheral edema and peripheral pulses strong  ABDOMEN: soft, nontender, no hepatosplenomegaly, no masses and bowel sounds normal  MS: no musculoskeletal defects are noted and gait is age appropriate without ataxia  SKIN: no suspicious lesions or rashes  NEURO: Normal strength and tone, sensory exam grossly normal, mentation intact and  "speech normal  PSYCH: mentation appears normal and affect normal/bright        ASSESSMENT/PLAN:   1. Routine general medical examination at a health care facility    - REVIEW OF HEALTH MAINTENANCE PROTOCOL ORDERS  - CBC with platelets  - Comprehensive metabolic panel  - Lipid panel reflex to direct LDL Fasting    2. Encounter for screening mammogram for breast cancer    - MA SCREENING DIGITAL BILAT - Future  (s+30); Future    3. Screening for HIV (human immunodeficiency virus)    - HIV Screening    4. Special screening for malignant neoplasms, colon    - Fecal colorectal cancer screen (FIT); Future    5. Personal history of tobacco use    - Prof Fee: Shared Decision Making Visit for Lung Cancer Screening  - CT Chest Lung Cancer Scrn Low Dose wo; Future    6. Need for shingles vaccine    - ZOSTER VACCINE RECOMBINANT ADJUVANTED IM NJX    7. Influenza vaccine needed    - C RIV4 (FLUBLOK) VACCINE RECOMBINANT DNA PRSRV ANTIBIO FREE, IM [76085]    COUNSELING:  Reviewed preventive health counseling, as reflected in patient instructions       Regular exercise       Healthy diet/nutrition       Vision screening       Hearing screening       Immunizations    Vaccinated for: Influenza and Varicella             Colon cancer screening       HIV screeninx in teen years, 1x in adult years, and at intervals if high risk    Estimated body mass index is 21.46 kg/m  as calculated from the following:    Height as of 10/23/18: 1.702 m (5' 7\").    Weight as of 10/23/18: 62.1 kg (137 lb).    Weight management plan noted, stable and monitoring     reports that she has quit smoking. Her smoking use included cigarettes. She has a 30.00 pack-year smoking history. She has never used smokeless tobacco.      Counseling Resources:  ATP IV Guidelines  Pooled Cohorts Equation Calculator  Breast Cancer Risk Calculator  FRAX Risk Assessment  ICSI Preventive Guidelines  Dietary Guidelines for Americans, 2010  USDA's MyPlate  ASA Prophylaxis  Lung " CA Screening    Tati Oakley PA-C  [facility]

## 2019-12-19 NOTE — PATIENT INSTRUCTIONS
Preventive Health Recommendations  Female Ages 50 - 64    Yearly exam: See your health care provider every year in order to  o Review health changes.   o Discuss preventive care.    o Review your medicines if your doctor has prescribed any.      Get a Pap test every three years (unless you have an abnormal result and your provider advises testing more often).    If you get Pap tests with HPV test, you only need to test every 5 years, unless you have an abnormal result.     You do not need a Pap test if your uterus was removed (hysterectomy) and you have not had cancer.    You should be tested each year for STDs (sexually transmitted diseases) if you're at risk.     Have a mammogram every 1 to 2 years.    Have a colonoscopy at age 50, or have a yearly FIT test (stool test). These exams screen for colon cancer.      Have a cholesterol test every 5 years, or more often if advised.    Have a diabetes test (fasting glucose) every three years. If you are at risk for diabetes, you should have this test more often.     If you are at risk for osteoporosis (brittle bone disease), think about having a bone density scan (DEXA).    Shots: Get a flu shot each year. Get a tetanus shot every 10 years.    Nutrition:     Eat at least 5 servings of fruits and vegetables each day.    Eat whole-grain bread, whole-wheat pasta and brown rice instead of white grains and rice.    Get adequate Calcium and Vitamin D.     Lifestyle    Exercise at least 150 minutes a week (30 minutes a day, 5 days a week). This will help you control your weight and prevent disease.    Limit alcohol to one drink per day.    No smoking.     Wear sunscreen to prevent skin cancer.     See your dentist every six months for an exam and cleaning.    See your eye doctor every 1 to 2 years.    Lung Cancer Screening   Frequently Asked Questions  If you are at high-risk for lung cancer, getting screened with low-dose computed tomography (LDCT) every year can help save  your life. This handout offers answers to some of the most common questions about lung cancer screening. If you have other questions, please call [phone] ([phone]).     What is it?  Lung cancer screening uses special X-ray technology to create an image of your lung tissue. The exam is quick and easy and takes less than 10 seconds. We don t give you any medicine or use any needles. You can eat before and after the exam. You don t need to change your clothes as long as the clothing on your chest doesn t contain metal. But, you do need to be able to hold your breath for at least 6 seconds during the exam.    What is the goal of lung cancer screening?  The goal of lung cancer screening is to save lives. Many times, lung cancer is not found until a person starts having physical symptoms. Lung cancer screening can help detect lung cancer in the earliest stages when it may be easier to treat.    Who should be screened for lung cancer?  We suggest lung cancer screening for anyone who is at high-risk for lung cancer. You are in the high-risk group if you:      are between the ages of 55 and 79, and    have smoked at least 1 pack of cigarettes a day for 30 or more years, and    still smoke or have quit within the past 15 years.    However, if you have a new cough or shortness of breath, you should talk to your doctor before being screened.    Some national lung health advocacy groups also recommend screening for people ages 50 to 79 who have smoked an average of 1 pack of cigarettes a day for 20 years. They must also have at least 1 other risk factor for lung cancer, not including exposure to secondhand smoke. Other risk factors are having had cancer in the past, emphysema, pulmonary fibrosis, COPD, a family history of lung cancer, or exposure to certain materials such as arsenic, asbestos, beryllium, cadmium, chromium, diesel fumes, nickel, radon or silica. Your care team can help you know if you have one of  these risk factors.     Why does it matter if I have symptoms?  Certain symptoms can be a sign that you have a condition in your lungs that should be checked and treated by your doctor. These symptoms include fever, chest pain, a new or changing cough, shortness of breath that you have never felt before, coughing up blood or unexplained weight loss. Having any of these symptoms can greatly affect the results of lung cancer screening.       Should all smokers get an LDCT lung cancer screening exam?  It depends. Lung cancer screening is for a very specific group of men and women who have a history of heavy smoking over a long period of time (see  Who should be screened for lung cancer  above).  I am in the high-risk group, but have been diagnosed with cancer in the past. Is LDCT lung cancer screening right for me?  In some cases, you should not have LDCT lung screening, such as when your doctor is already following your cancer with CT scan studies. Your doctor will help you decide if LDCT lung screening is right for you.  Do I need to have a screening exam every year?  Yes. If you are in the high-risk group described earlier, you should get an LDCT lung cancer screening exam every year until you are 79, or are no longer willing or able to undergo screening and possible procedures to diagnose and treat lung cancer.  How effective is LDCT at preventing death from lung cancer?  Studies have shown that LDCT lung cancer screening can lower the risk of death from lung cancer by 20 percent in people who are at high-risk.  What are the risks?  There are some risks and limitations of LDCT lung cancer screening. We want to make sure you understand the risks and benefits, so please let us know if you have any questions. Your doctor may want to talk with you more about these risks.    Radiation exposure: As with any exam that uses radiation, there is a very small increased risk of cancer. The amount of radiation in LDCT is  small--about the same amount a person would get from a mammogram. Your doctor orders the exam when he or she feels the potential benefits outweigh the risks.    False negatives: No test is perfect, including LDCT. It is possible that you may have a medical condition, including lung cancer, that is not found during your exam. This is called a false negative result.    False positives and more testing: LDCT very often finds something in the lung that could be cancer, but in fact is not. This is called a false positive result. False positive tests often cause anxiety. To make sure these findings are not cancer, you may need to have more tests. These tests will be done only if you give us permission. Sometimes patients need a treatment that can have side effects, such as a biopsy. For more information on false positives, see  What can I expect from the results?     Findings not related to lung cancer: Your LDCT exam also takes pictures of areas of your body next to your lungs. In a very small number of cases, the CT scan will show an abnormal finding in one of these areas, such as your kidneys, adrenal glands, liver or thyroid. This finding may not be serious, but you may need more tests. Your doctor can help you decide what other tests you may need, if any.  What can I expect from the results?  About 1 out of 4 LDCT exams will find something that may need more tests. Most of the time, these findings are lung nodules. Lung nodules are very small collections of tissue in the lung. These nodules are very common, and the vast majority--more than 97 percent--are not cancer (benign). Most are normal lymph nodes or small areas of scarring from past infections.  But, if a small lung nodule is found to be cancer, the cancer can be cured more than 90 percent of the time. To know if the nodule is cancer, we may need to get more images before your next yearly screening exam. If the nodule has suspicious features (for example, it  is large, has an odd shape or grows over time), we will refer you to a specialist for further testing.  Will my doctor also get the results?  Yes. Your doctor will get a copy of your results.  Is it okay to keep smoking now that there s a cancer screening exam?  No. Tobacco is one of the strongest cancer-causing agents. It causes not only lung cancer, but other cancers and cardiovascular (heart) diseases as well. The damage caused by smoking builds over time. This means that the longer you smoke, the higher your risk of disease. While it is never too late to quit, the sooner you quit, the better.  Where can I find help to quit smoking?  The best way to prevent lung cancer is to stop smoking. If you have already quit smoking, congratulations and keep it up! For help on quitting smoking, please call [employer] at 7-780-198-QCWI ([phone]) or the American [employer] Society at 1-595.168.1835 to find local resources near you.  One-on-one health coaching:  If you d prefer to work individually with a health care provider on tobacco cessation, we offer:      Medication Therapy Management:  Our specially trained pharmacists work closely with you and your doctor to help you quit smoking.  Call [phone] or [phone] (toll free).     Can Do: Health coaching offered by [facility].  [email or web address]

## 2019-12-20 LAB — HIV 1+2 AB+HIV1 P24 AG SERPL QL IA: NONREACTIVE

## 2019-12-31 ENCOUNTER — HOSPITAL ENCOUNTER (OUTPATIENT)
Dept: CT IMAGING | Facility: CLINIC | Age: 59
Discharge: HOME | End: 2019-12-31
Attending: PHYSICIAN ASSISTANT | Admitting: PHYSICIAN ASSISTANT
Payer: COMMERCIAL

## 2019-12-31 ENCOUNTER — HOSPITAL ENCOUNTER (OUTPATIENT)
Dept: MAMMOGRAPHY | Facility: CLINIC | Age: 59
End: 2019-12-31
Attending: PHYSICIAN ASSISTANT
Payer: COMMERCIAL

## 2019-12-31 DIAGNOSIS — Z87.891 PERSONAL HISTORY OF TOBACCO USE: ICD-10-CM

## 2019-12-31 DIAGNOSIS — Z12.31 ENCOUNTER FOR SCREENING MAMMOGRAM FOR BREAST CANCER: ICD-10-CM

## 2019-12-31 PROCEDURE — 77067 SCR MAMMO BI INCL CAD: CPT

## 2019-12-31 PROCEDURE — G0297 LDCT FOR LUNG CA SCREEN: HCPCS

## 2020-02-11 DIAGNOSIS — Z12.11 SPECIAL SCREENING FOR MALIGNANT NEOPLASMS, COLON: ICD-10-CM

## 2020-02-11 LAB — HEMOCCULT STL QL IA: NEGATIVE

## 2020-02-11 PROCEDURE — 82274 ASSAY TEST FOR BLOOD FECAL: CPT | Performed by: PHYSICIAN ASSISTANT

## 2020-12-08 ASSESSMENT — ENCOUNTER SYMPTOMS
FREQUENCY: 0
HEADACHES: 0
WEAKNESS: 0
COUGH: 0
JOINT SWELLING: 0
HEMATURIA: 0
FEVER: 0
NERVOUS/ANXIOUS: 0
SORE THROAT: 0
BREAST MASS: 0
CONSTIPATION: 0
NAUSEA: 0
DYSURIA: 0
PARESTHESIAS: 0
PALPITATIONS: 0
DIARRHEA: 0
DIZZINESS: 0
HEMATOCHEZIA: 0
HEARTBURN: 0
MYALGIAS: 0
EYE PAIN: 0
SHORTNESS OF BREATH: 0
ABDOMINAL PAIN: 0
ARTHRALGIAS: 0
CHILLS: 0

## 2020-12-09 ENCOUNTER — OFFICE VISIT (OUTPATIENT)
Dept: FAMILY MEDICINE | Facility: CLINIC | Age: 60
End: 2020-12-09
Payer: COMMERCIAL

## 2020-12-09 VITALS
TEMPERATURE: 98.2 F | HEART RATE: 96 BPM | DIASTOLIC BLOOD PRESSURE: 70 MMHG | BODY MASS INDEX: 23.86 KG/M2 | HEIGHT: 67 IN | RESPIRATION RATE: 20 BRPM | SYSTOLIC BLOOD PRESSURE: 106 MMHG | OXYGEN SATURATION: 98 % | WEIGHT: 152 LBS

## 2020-12-09 DIAGNOSIS — Z12.2 ENCOUNTER FOR SCREENING FOR LUNG CANCER: ICD-10-CM

## 2020-12-09 DIAGNOSIS — Z12.12 SCREENING FOR COLORECTAL CANCER: ICD-10-CM

## 2020-12-09 DIAGNOSIS — Z00.00 ROUTINE GENERAL MEDICAL EXAMINATION AT A HEALTH CARE FACILITY: Primary | ICD-10-CM

## 2020-12-09 DIAGNOSIS — Z12.11 SCREENING FOR COLORECTAL CANCER: ICD-10-CM

## 2020-12-09 DIAGNOSIS — Z12.31 VISIT FOR SCREENING MAMMOGRAM: ICD-10-CM

## 2020-12-09 DIAGNOSIS — Z87.891 HISTORY OF TOBACCO USE: ICD-10-CM

## 2020-12-09 LAB
ERYTHROCYTE [DISTWIDTH] IN BLOOD BY AUTOMATED COUNT: 12.4 % (ref 10–15)
HCT VFR BLD AUTO: 39 % (ref 35–47)
HGB BLD-MCNC: 12.9 G/DL (ref 11.7–15.7)
MCH RBC QN AUTO: 30.6 PG (ref 26.5–33)
MCHC RBC AUTO-ENTMCNC: 33.1 G/DL (ref 31.5–36.5)
MCV RBC AUTO: 92 FL (ref 78–100)
PLATELET # BLD AUTO: 274 10E9/L (ref 150–450)
RBC # BLD AUTO: 4.22 10E12/L (ref 3.8–5.2)
WBC # BLD AUTO: 5.1 10E9/L (ref 4–11)

## 2020-12-09 PROCEDURE — 90471 IMMUNIZATION ADMIN: CPT | Performed by: PHYSICIAN ASSISTANT

## 2020-12-09 PROCEDURE — 99396 PREV VISIT EST AGE 40-64: CPT | Mod: 25 | Performed by: PHYSICIAN ASSISTANT

## 2020-12-09 PROCEDURE — 90715 TDAP VACCINE 7 YRS/> IM: CPT | Performed by: PHYSICIAN ASSISTANT

## 2020-12-09 PROCEDURE — 85027 COMPLETE CBC AUTOMATED: CPT | Performed by: PHYSICIAN ASSISTANT

## 2020-12-09 PROCEDURE — 80053 COMPREHEN METABOLIC PANEL: CPT | Performed by: PHYSICIAN ASSISTANT

## 2020-12-09 PROCEDURE — 90472 IMMUNIZATION ADMIN EACH ADD: CPT | Performed by: PHYSICIAN ASSISTANT

## 2020-12-09 PROCEDURE — G0296 VISIT TO DETERM LDCT ELIG: HCPCS | Performed by: PHYSICIAN ASSISTANT

## 2020-12-09 PROCEDURE — 80061 LIPID PANEL: CPT | Performed by: PHYSICIAN ASSISTANT

## 2020-12-09 PROCEDURE — 36415 COLL VENOUS BLD VENIPUNCTURE: CPT | Performed by: PHYSICIAN ASSISTANT

## 2020-12-09 PROCEDURE — 90682 RIV4 VACC RECOMBINANT DNA IM: CPT | Performed by: PHYSICIAN ASSISTANT

## 2020-12-09 ASSESSMENT — ENCOUNTER SYMPTOMS
ARTHRALGIAS: 0
DIZZINESS: 0
HEMATURIA: 0
NAUSEA: 0
NERVOUS/ANXIOUS: 0
PARESTHESIAS: 0
CHILLS: 0
CONSTIPATION: 0
HEARTBURN: 0
PALPITATIONS: 0
FEVER: 0
JOINT SWELLING: 0
DIARRHEA: 0
DYSURIA: 0
HEADACHES: 0
SHORTNESS OF BREATH: 0
HEMATOCHEZIA: 0
FREQUENCY: 0
MYALGIAS: 0
COUGH: 0
WEAKNESS: 0
SORE THROAT: 0
ABDOMINAL PAIN: 0
EYE PAIN: 0
BREAST MASS: 0

## 2020-12-09 ASSESSMENT — MIFFLIN-ST. JEOR: SCORE: 1284.16

## 2020-12-09 NOTE — PROGRESS NOTES
SUBJECTIVE:   CC: Henrietta Conner is an 60 year old woman who presents for preventive health visit.       Patient has been advised of split billing requirements and indicates understanding: Yes  Healthy Habits:     Getting at least 3 servings of Calcium per day:  Yes    Bi-annual eye exam:  Yes    Dental care twice a year:  Yes    Sleep apnea or symptoms of sleep apnea:  None    Diet:  Regular (no restrictions)    Frequency of exercise:  2-3 days/week    Duration of exercise:  Less than 15 minutes    Taking medications regularly:  Yes    Medication side effects:  Not applicable    PHQ-2 Total Score: 0    Additional concerns today:  No    Future Appointments   Date Time Provider Department Center   12/9/2020  3:30 PM Tati Oakley, [facility]     Appointment Notes for this encounter:   Yearly Physical    Health Maintenance Due   Topic Date Due     INFLUENZA VACCINE (1) 09/01/2020     DTAP/TDAP/TD IMMUNIZATION (3 - Td) 10/08/2020     ZOSTER IMMUNIZATION (2 of 2) 02/13/2020     PREVENTIVE CARE VISIT  12/19/2020     ANNUAL REVIEW OF HM ORDERS  12/19/2020     MAMMO SCREENING  12/31/2020     LUNG CANCER SCREENING ANNUAL  12/31/2020     Health Maintenance addressed:  Mammogram    Mammogram Pt agrees to schedule appt today or future appt scheduled    MyChart Status:  Active and Using      Today's PHQ-2 Score:   PHQ-2 ( 1999 [employer]) 12/8/2020   Q1: Little interest or pleasure in doing things 0   Q2: Feeling down, depressed or hopeless 0   PHQ-2 Score 0   Q1: Little interest or pleasure in doing things Not at all   Q2: Feeling down, depressed or hopeless Not at all   PHQ-2 Score 0       Abuse: Current or Past (Physical, Sexual or Emotional) - No  Do you feel safe in your environment? Yes        Social History     Tobacco Use     Smoking status: Former Smoker     Packs/day: 1.00     Years: 30.00     Pack years: 30.00     Types: Cigarettes     Smokeless tobacco: Never Used   Substance Use Topics     Alcohol use:  No     Frequency: Never     Drinks per session: Patient refused     Binge frequency: Never         Alcohol Use 12/8/2020   Prescreen: >3 drinks/day or >7 drinks/week? Not Applicable   Prescreen: >3 drinks/day or >7 drinks/week? -       Reviewed orders with patient.  Reviewed health maintenance and updated orders accordingly - Yes  Lab work is in process    Mammogram Screening: Patient over age 50, mutual decision to screen reflected in health maintenance.    Pertinent mammograms are reviewed under the imaging tab.  History of abnormal Pap smear: NO - age 30-65 PAP every 5 years with negative HPV co-testing recommended  PAP / HPV 10/8/2010 9/1/2009 8/4/2008   PAP NIL NIL NIL     Reviewed and updated as needed this visit by clinical staff  Tobacco  Allergies    Med Hx  Surg Hx  Fam Hx          Reviewed and updated as needed this visit by Provider                Past Medical History:   Diagnosis Date     Endometriosis of pelvic peritoneum         Review of Systems   Constitutional: Negative for chills and fever.   HENT: Negative for congestion, ear pain, hearing loss and sore throat.    Eyes: Positive for visual disturbance. Negative for pain.   Respiratory: Negative for cough and shortness of breath.    Cardiovascular: Negative for chest pain, palpitations and peripheral edema.   Gastrointestinal: Negative for abdominal pain, constipation, diarrhea, heartburn, hematochezia and nausea.   Breasts:  Negative for tenderness, breast mass and discharge.   Genitourinary: Negative for dysuria, frequency, genital sores, hematuria, pelvic pain, urgency, vaginal bleeding and vaginal discharge.   Musculoskeletal: Negative for arthralgias, joint swelling and myalgias.   Skin: Negative for rash.   Neurological: Negative for dizziness, weakness, headaches and paresthesias.   Psychiatric/Behavioral: Negative for mood changes. The patient is not nervous/anxious.           OBJECTIVE:   /70 (BP Location: Right arm, Patient  "Position: Chair, Cuff Size: Adult Large)   Pulse 96   Temp 98.2  F (36.8  C) (Oral)   Resp 20   Ht 1.689 m (5' 6.5\")   Wt 68.9 kg (152 lb)   LMP 06/01/2006   SpO2 98%   BMI 24.17 kg/m    Physical Exam  GENERAL APPEARANCE: healthy, alert and no distress  EYES: Eyes grossly normal to inspection, PERRL and conjunctivae and sclerae normal  HENT: ear canals and TM's normal, nose and mouth without ulcers or lesions, oropharynx clear and oral mucous membranes moist  NECK: no adenopathy, no asymmetry, masses, or scars and thyroid normal to palpation  RESP: lungs clear to auscultation - no rales, rhonchi or wheezes  BREAST: normal without masses, tenderness or nipple discharge and no palpable axillary masses or adenopathy  CV: regular rate and rhythm, normal S1 S2, no S3 or S4, no murmur, click or rub, no peripheral edema and peripheral pulses strong  ABDOMEN: soft, nontender, no hepatosplenomegaly, no masses and bowel sounds normal  MS: no musculoskeletal defects are noted and gait is age appropriate without ataxia  SKIN: no suspicious lesions or rashes  NEURO: Normal strength and tone, sensory exam grossly normal, mentation intact and speech normal  PSYCH: mentation appears normal and affect normal/bright    Diagnostic Test Results:  Labs reviewed in Epic    ASSESSMENT/PLAN:   1. Routine general medical examination at a health care facility    - CBC with platelets  - Comprehensive metabolic panel (BMP + Alb, Alk Phos, ALT, AST, Total. Bili, TP)  - Lipid panel reflex to direct LDL Non-fasting    2. Visit for screening mammogram    - Mammo Screening digital (bilat); Future    3. Screening for colorectal cancer    - Fecal colorectal cancer screen (FIT); Future    4. Encounter for screening for lung cancer    - Prof Fee: Shared Decision Making Visit for Lung Cancer Screening  - CT Chest Lung Cancer Scrn Low Dose wo; Future    5. History of tobacco use    - Prof Fee: Shared Decision Making Visit for Lung Cancer " "Screening  - CT Chest Lung Cancer Scrn Low Dose wo; Future          Patient has been advised of split billing requirements and indicates understanding: Yes  COUNSELING:  Reviewed preventive health counseling, as reflected in patient instructions       Regular exercise       Healthy diet/nutrition       Vision screening       Immunizations    Vaccinated for: Influenza and TDAP             Consider lung cancer screening for ages 55-80 years and 30 pack-year smoking history     Estimated body mass index is 24.17 kg/m  as calculated from the following:    Height as of this encounter: 1.689 m (5' 6.5\").    Weight as of this encounter: 68.9 kg (152 lb).        She reports that she has quit smoking. Her smoking use included cigarettes. She has a 30.00 pack-year smoking history. She has never used smokeless tobacco.      Counseling Resources:  ATP IV Guidelines  Pooled Cohorts Equation Calculator  Breast Cancer Risk Calculator  BRCA-Related Cancer Risk Assessment: FHS-7 Tool  FRAX Risk Assessment  ICSI Preventive Guidelines  Dietary Guidelines for Americans, 2010  USDA's MyPlate  ASA Prophylaxis  Lung CA Screening    Tati Oakley PA-C  [facility]  "

## 2020-12-09 NOTE — PROGRESS NOTES
Lung Cancer Screening Shared Decision Making Visit     Henrietta Conner is eligible for lung cancer screening on the basis of the information provided in my signed lung cancer screening order.     I have discussed with patient the risks and benefits of screening for lung cancer with low-dose CT.     The risks include:  radiation exposure: one low dose chest CT has as much ionizing radiation as about 15 chest x-rays or 6 months of background radiation living in [place]    false positives: 96% of positive findings/nodules are NOT cancer, but some might still require additional diagnostic evaluation, including biopsy  over-diagnosis: some slow growing cancers that might never have been clinically significant will be detected and treated unnecessarily     The benefit of early detection of lung cancer is contingent upon adherence to annual screening or more frequent follow up if indicated.     Furthermore, reaping the benefits of screening requires Henrietta Conner to be willing and physically able to undergo diagnostic procedures, if indicated. Although no specific guide is available for determining severity of comorbidities, it is reasonable to withhold screening in patients who have greater mortality risk from other diseases.     We did discuss that the only way to prevent lung cancer is to not smoke. Smoking cessation counseling was not given.      I did not offer risk estimation using a calculator such as this one:    ShouldIScreen

## 2020-12-09 NOTE — PATIENT INSTRUCTIONS
Preventive Health Recommendations  Female Ages 50 - 64    Yearly exam: See your health care provider every year in order to  o Review health changes.   o Discuss preventive care.    o Review your medicines if your doctor has prescribed any.      Get a Pap test every three years (unless you have an abnormal result and your provider advises testing more often).    If you get Pap tests with HPV test, you only need to test every 5 years, unless you have an abnormal result.     You do not need a Pap test if your uterus was removed (hysterectomy) and you have not had cancer.    You should be tested each year for STDs (sexually transmitted diseases) if you're at risk.     Have a mammogram every 1 to 2 years.    Have a colonoscopy at age 50, or have a yearly FIT test (stool test). These exams screen for colon cancer.      Have a cholesterol test every 5 years, or more often if advised.    Have a diabetes test (fasting glucose) every three years. If you are at risk for diabetes, you should have this test more often.     If you are at risk for osteoporosis (brittle bone disease), think about having a bone density scan (DEXA).    Shots: Get a flu shot each year. Get a tetanus shot every 10 years.    Nutrition:     Eat at least 5 servings of fruits and vegetables each day.    Eat whole-grain bread, whole-wheat pasta and brown rice instead of white grains and rice.    Get adequate Calcium and Vitamin D.     Lifestyle    Exercise at least 150 minutes a week (30 minutes a day, 5 days a week). This will help you control your weight and prevent disease.    Limit alcohol to one drink per day.    No smoking.     Wear sunscreen to prevent skin cancer.     See your dentist every six months for an exam and cleaning.    See your eye doctor every 1 to 2 years.    Lung Cancer Screening   Frequently Asked Questions  If you are at high-risk for lung cancer, getting screened with low-dose computed tomography (LDCT) every year can help save  your life. This handout offers answers to some of the most common questions about lung cancer screening. If you have other questions, please call [phone] ([phone]).     What is it?  Lung cancer screening uses special X-ray technology to create an image of your lung tissue. The exam is quick and easy and takes less than 10 seconds. We don t give you any medicine or use any needles. You can eat before and after the exam. You don t need to change your clothes as long as the clothing on your chest doesn t contain metal. But, you do need to be able to hold your breath for at least 6 seconds during the exam.    What is the goal of lung cancer screening?  The goal of lung cancer screening is to save lives. Many times, lung cancer is not found until a person starts having physical symptoms. Lung cancer screening can help detect lung cancer in the earliest stages when it may be easier to treat.    Who should be screened for lung cancer?  We suggest lung cancer screening for anyone who is at high-risk for lung cancer. You are in the high-risk group if you:      are between the ages of 55 and 79, and    have smoked at least 1 pack of cigarettes a day for 30 or more years, and    still smoke or have quit within the past 15 years.    However, if you have a new cough or shortness of breath, you should talk to your doctor before being screened.    Some national lung health advocacy groups also recommend screening for people ages 50 to 79 who have smoked an average of 1 pack of cigarettes a day for 20 years. They must also have at least 1 other risk factor for lung cancer, not including exposure to secondhand smoke. Other risk factors are having had cancer in the past, emphysema, pulmonary fibrosis, COPD, a family history of lung cancer, or exposure to certain materials such as arsenic, asbestos, beryllium, cadmium, chromium, diesel fumes, nickel, radon or silica. Your care team can help you know if you have one of  these risk factors.     Why does it matter if I have symptoms?  Certain symptoms can be a sign that you have a condition in your lungs that should be checked and treated by your doctor. These symptoms include fever, chest pain, a new or changing cough, shortness of breath that you have never felt before, coughing up blood or unexplained weight loss. Having any of these symptoms can greatly affect the results of lung cancer screening.       Should all smokers get an LDCT lung cancer screening exam?  It depends. Lung cancer screening is for a very specific group of men and women who have a history of heavy smoking over a long period of time (see  Who should be screened for lung cancer  above).  I am in the high-risk group, but have been diagnosed with cancer in the past. Is LDCT lung cancer screening right for me?  In some cases, you should not have LDCT lung screening, such as when your doctor is already following your cancer with CT scan studies. Your doctor will help you decide if LDCT lung screening is right for you.  Do I need to have a screening exam every year?  Yes. If you are in the high-risk group described earlier, you should get an LDCT lung cancer screening exam every year until you are 79, or are no longer willing or able to undergo screening and possible procedures to diagnose and treat lung cancer.  How effective is LDCT at preventing death from lung cancer?  Studies have shown that LDCT lung cancer screening can lower the risk of death from lung cancer by 20 percent in people who are at high-risk.  What are the risks?  There are some risks and limitations of LDCT lung cancer screening. We want to make sure you understand the risks and benefits, so please let us know if you have any questions. Your doctor may want to talk with you more about these risks.    Radiation exposure: As with any exam that uses radiation, there is a very small increased risk of cancer. The amount of radiation in LDCT is  small--about the same amount a person would get from a mammogram. Your doctor orders the exam when he or she feels the potential benefits outweigh the risks.    False negatives: No test is perfect, including LDCT. It is possible that you may have a medical condition, including lung cancer, that is not found during your exam. This is called a false negative result.    False positives and more testing: LDCT very often finds something in the lung that could be cancer, but in fact is not. This is called a false positive result. False positive tests often cause anxiety. To make sure these findings are not cancer, you may need to have more tests. These tests will be done only if you give us permission. Sometimes patients need a treatment that can have side effects, such as a biopsy. For more information on false positives, see  What can I expect from the results?     Findings not related to lung cancer: Your LDCT exam also takes pictures of areas of your body next to your lungs. In a very small number of cases, the CT scan will show an abnormal finding in one of these areas, such as your kidneys, adrenal glands, liver or thyroid. This finding may not be serious, but you may need more tests. Your doctor can help you decide what other tests you may need, if any.  What can I expect from the results?  About 1 out of 4 LDCT exams will find something that may need more tests. Most of the time, these findings are lung nodules. Lung nodules are very small collections of tissue in the lung. These nodules are very common, and the vast majority--more than 97 percent--are not cancer (benign). Most are normal lymph nodes or small areas of scarring from past infections.  But, if a small lung nodule is found to be cancer, the cancer can be cured more than 90 percent of the time. To know if the nodule is cancer, we may need to get more images before your next yearly screening exam. If the nodule has suspicious features (for example, it  is large, has an odd shape or grows over time), we will refer you to a specialist for further testing.  Will my doctor also get the results?  Yes. Your doctor will get a copy of your results.  Is it okay to keep smoking now that there s a cancer screening exam?  No. Tobacco is one of the strongest cancer-causing agents. It causes not only lung cancer, but other cancers and cardiovascular (heart) diseases as well. The damage caused by smoking builds over time. This means that the longer you smoke, the higher your risk of disease. While it is never too late to quit, the sooner you quit, the better.  Where can I find help to quit smoking?  The best way to prevent lung cancer is to stop smoking. If you have already quit smoking, congratulations and keep it up! For help on quitting smoking, please call [employer] at 1-073-790-CZOK (1442) or the American [employer] Society at 1-547.646.5304 to find local resources near you.  One-on-one health coaching:  If you d prefer to work individually with a health care provider on tobacco cessation, we offer:      Medication Therapy Management:  Our specially trained pharmacists work closely with you and your doctor to help you quit smoking.  Call [phone] or [phone] (toll free).     Can Do: Health coaching offered by [facility].  [email or web address]

## 2020-12-11 LAB
ALBUMIN SERPL-MCNC: 3.8 G/DL (ref 3.4–5)
ALP SERPL-CCNC: 61 U/L (ref 40–150)
ALT SERPL W P-5'-P-CCNC: 25 U/L (ref 0–50)
ANION GAP SERPL CALCULATED.3IONS-SCNC: 3 MMOL/L (ref 3–14)
AST SERPL W P-5'-P-CCNC: 19 U/L (ref 0–45)
BILIRUB SERPL-MCNC: 0.2 MG/DL (ref 0.2–1.3)
BUN SERPL-MCNC: 22 MG/DL (ref 7–30)
CALCIUM SERPL-MCNC: 8.7 MG/DL (ref 8.5–10.1)
CHLORIDE SERPL-SCNC: 110 MMOL/L (ref 94–109)
CHOLEST SERPL-MCNC: 201 MG/DL
CO2 SERPL-SCNC: 27 MMOL/L (ref 20–32)
CREAT SERPL-MCNC: 0.71 MG/DL (ref 0.52–1.04)
GFR SERPL CREATININE-BSD FRML MDRD: >90 ML/MIN/{1.73_M2}
GLUCOSE SERPL-MCNC: 102 MG/DL (ref 70–99)
HDLC SERPL-MCNC: 55 MG/DL
LDLC SERPL CALC-MCNC: 130 MG/DL
NONHDLC SERPL-MCNC: 146 MG/DL
POTASSIUM SERPL-SCNC: 3.8 MMOL/L (ref 3.4–5.3)
PROT SERPL-MCNC: 6.6 G/DL (ref 6.8–8.8)
SODIUM SERPL-SCNC: 140 MMOL/L (ref 133–144)
TRIGL SERPL-MCNC: 82 MG/DL

## 2021-01-12 ENCOUNTER — HOSPITAL ENCOUNTER (OUTPATIENT)
Dept: MAMMOGRAPHY | Facility: CLINIC | Age: 61
End: 2021-01-12
Attending: PHYSICIAN ASSISTANT
Payer: COMMERCIAL

## 2021-01-12 ENCOUNTER — HOSPITAL ENCOUNTER (OUTPATIENT)
Dept: CT IMAGING | Facility: CLINIC | Age: 61
End: 2021-01-12
Attending: PHYSICIAN ASSISTANT
Payer: COMMERCIAL

## 2021-01-12 DIAGNOSIS — Z12.2 ENCOUNTER FOR SCREENING FOR LUNG CANCER: ICD-10-CM

## 2021-01-12 DIAGNOSIS — Z12.31 VISIT FOR SCREENING MAMMOGRAM: ICD-10-CM

## 2021-01-12 DIAGNOSIS — Z87.891 HISTORY OF TOBACCO USE: ICD-10-CM

## 2021-01-12 DIAGNOSIS — Z12.11 SCREENING FOR COLORECTAL CANCER: ICD-10-CM

## 2021-01-12 DIAGNOSIS — Z12.12 SCREENING FOR COLORECTAL CANCER: ICD-10-CM

## 2021-01-12 PROCEDURE — 82274 ASSAY TEST FOR BLOOD FECAL: CPT | Performed by: PHYSICIAN ASSISTANT

## 2021-01-12 PROCEDURE — 77067 SCR MAMMO BI INCL CAD: CPT

## 2021-01-12 PROCEDURE — 71271 CT THORAX LUNG CANCER SCR C-: CPT

## 2021-01-14 LAB — HEMOCCULT STL QL IA: NEGATIVE

## 2021-09-07 ENCOUNTER — VIRTUAL VISIT (OUTPATIENT)
Dept: FAMILY MEDICINE | Facility: CLINIC | Age: 61
End: 2021-09-07
Payer: COMMERCIAL

## 2021-09-07 ENCOUNTER — TELEPHONE (OUTPATIENT)
Dept: FAMILY MEDICINE | Facility: CLINIC | Age: 61
End: 2021-09-07

## 2021-09-07 DIAGNOSIS — U07.1 2019 NOVEL CORONAVIRUS DISEASE (COVID-19): Primary | ICD-10-CM

## 2021-09-07 PROCEDURE — 99213 OFFICE O/P EST LOW 20 MIN: CPT | Mod: TEL | Performed by: PHYSICIAN ASSISTANT

## 2021-09-07 RX ORDER — ALBUTEROL SULFATE 90 UG/1
1-2 AEROSOL, METERED RESPIRATORY (INHALATION) EVERY 4 HOURS PRN
Qty: 18 G | Refills: 1 | Status: SHIPPED | OUTPATIENT
Start: 2021-09-07

## 2021-09-07 RX ORDER — PREDNISONE 20 MG/1
40 TABLET ORAL DAILY
Qty: 10 TABLET | Refills: 0 | Status: SHIPPED | OUTPATIENT
Start: 2021-09-07 | End: 2021-09-10

## 2021-09-07 NOTE — TELEPHONE ENCOUNTER
"Pt calls, wants to schedule appointment, has covid symptoms, routed to CJ, ok to use 20 minute virtual visit today?    ~received first covid vaccine 8/27  ~both  pt started having symptoms around 8/29  ~ diagnosed 9/1  ~pt has symptoms but never went in, \"figures had same thing since has same symptoms as \"  ~discussed, wants to discus treatment etc with PCP    Routed to CJ, please advise, route to inform pt if okay or what follow up plan, pt was also trying to schedule virtual visit via BTR with any available provider but having trouble scheduling    Inform pt at [phone] (home)   Genie Silva RN, BSN  Message handled by CLINIC NURSE.    "

## 2021-09-07 NOTE — PROGRESS NOTES
Karen is a 61 year old who is being evaluated via a billable telephone visit.      What phone number would you like to be contacted at? [phone]  How would you like to obtain your AVS? [facility]    Assessment & Plan     2019 novel coronavirus disease (COVID-19)   is positive. Patient with same symptoms declines testing.   If increased SOB or CP, 911 or ER The patient indicates understanding of these issues and agrees with the plan.    - albuterol (PROAIR HFA/PROVENTIL HFA/VENTOLIN HFA) 108 (90 Base) MCG/ACT inhaler; Inhale 1-2 puffs into the lungs every 4 hours as needed for shortness of breath / dyspnea or wheezing  - predniSONE (DELTASONE) 20 MG tablet; Take 2 tablets (40 mg) by mouth daily for 5 days                 No follow-ups on file.    Tati Oakley PA-C  [facility]   Karen is a 61 year old who presents for the following health issues     HPI       Concern for COVID-19  About how many days ago did these symptoms start? 08/29/2021  Is this your first visit for this illness? Yes  In the 14 days before your symptoms started, have you had close contact with someone with COVID-19 (Coronavirus)? No  Do you have a fever or chills? Yes, the highest temperature was 100.4  Are you having new or worsening difficulty breathing? Yes   Please describe what kind of difficulty you are having breathing:Mild dyspnea (able to do ADLs without difficulty, mild shortness of breath with activities such as climbing one or two flights of stairs or walking briskly)  Do you have new or worsening cough? Yes, I am coughing up mucus.  Have you had any new or unexplained body aches? YES    Have you experienced any of the following NEW symptoms?    Headache: YES    Sore throat: No    Loss of taste or smell: Difference in taste and smell    Chest pain: no    Diarrhea: No    Rash: No  What treatments have you tried? None  Who do you live with?   Are you, or a household member, a  healthcare worker or a ? No  Do you live in a nursing home, group home, or shelter? No  Do you have a way to get food/medications if quarantined? Yes Family member                  Review of Systems   Constitutional, HEENT, cardiovascular, pulmonary, gi and gu systems are negative, except as otherwise noted.      Objective           Vitals:  No vitals were obtained today due to virtual visit.    Physical Exam   alert and no distress  PSYCH: Alert and oriented times 3; coherent speech, normal   rate and volume, able to articulate logical thoughts, able   to abstract reason, no tangential thoughts, no hallucinations   or delusions  Her affect is normal and full  RESP: No cough, no audible wheezing, able to talk in full sentences  Remainder of exam unable to be completed due to telephone visits                Phone call duration: 8 minutes

## 2021-09-07 NOTE — TELEPHONE ENCOUNTER
Yes, okay to schedule virtual appt today or really anytime this week.  Help pt get scheduled. Otherwise, she can go to [email or web address] through [employer] and get response back in less than 1 hour.     Tati Oakley PA-C

## 2021-09-10 ENCOUNTER — HOSPITAL ENCOUNTER (INPATIENT)
Facility: CLINIC | Age: 61
LOS: 7 days | Discharge: HOME | DRG: 177 | End: 2021-09-17
Attending: EMERGENCY MEDICINE | Admitting: INTERNAL MEDICINE
Payer: COMMERCIAL

## 2021-09-10 ENCOUNTER — APPOINTMENT (OUTPATIENT)
Dept: CT IMAGING | Facility: CLINIC | Age: 61
DRG: 177 | End: 2021-09-10
Attending: EMERGENCY MEDICINE
Payer: COMMERCIAL

## 2021-09-10 DIAGNOSIS — J12.82 PNEUMONIA DUE TO 2019 NOVEL CORONAVIRUS: Primary | ICD-10-CM

## 2021-09-10 DIAGNOSIS — R79.89 ELEVATED LACTIC ACID LEVEL: ICD-10-CM

## 2021-09-10 DIAGNOSIS — R09.02 HYPOXIA: ICD-10-CM

## 2021-09-10 DIAGNOSIS — U07.1 PNEUMONIA DUE TO 2019 NOVEL CORONAVIRUS: Primary | ICD-10-CM

## 2021-09-10 DIAGNOSIS — E87.6 HYPOKALEMIA: ICD-10-CM

## 2021-09-10 DIAGNOSIS — U07.1 2019 NOVEL CORONAVIRUS DISEASE (COVID-19): ICD-10-CM

## 2021-09-10 LAB
ABO/RH(D): NORMAL
ALBUMIN SERPL-MCNC: 2.6 G/DL (ref 3.4–5)
ALP SERPL-CCNC: 92 U/L (ref 40–150)
ALT SERPL W P-5'-P-CCNC: 56 U/L (ref 0–50)
ANION GAP SERPL CALCULATED.3IONS-SCNC: 5 MMOL/L (ref 3–14)
APTT PPP: 30 SECONDS (ref 22–38)
AST SERPL W P-5'-P-CCNC: 67 U/L (ref 0–45)
BASOPHILS # BLD MANUAL: 0 10E3/UL (ref 0–0.2)
BASOPHILS NFR BLD MANUAL: 0 %
BILIRUB SERPL-MCNC: 0.4 MG/DL (ref 0.2–1.3)
BUN SERPL-MCNC: 14 MG/DL (ref 7–30)
CALCIUM SERPL-MCNC: 9.3 MG/DL (ref 8.5–10.1)
CHLORIDE BLD-SCNC: 102 MMOL/L (ref 94–109)
CO2 SERPL-SCNC: 30 MMOL/L (ref 20–32)
CREAT SERPL-MCNC: 0.66 MG/DL (ref 0.52–1.04)
CRP SERPL-MCNC: 305 MG/L (ref 0–8)
D DIMER PPP FEU-MCNC: 0.62 UG/ML FEU (ref 0–0.5)
EOSINOPHIL # BLD MANUAL: 0 10E3/UL (ref 0–0.7)
EOSINOPHIL NFR BLD MANUAL: 0 %
ERYTHROCYTE [DISTWIDTH] IN BLOOD BY AUTOMATED COUNT: 12.6 % (ref 10–15)
FIBRINOGEN PPP-MCNC: 616 MG/DL (ref 170–490)
GFR SERPL CREATININE-BSD FRML MDRD: >90 ML/MIN/1.73M2
GLUCOSE BLD-MCNC: 128 MG/DL (ref 70–99)
HCO3 BLDV-SCNC: 28 MMOL/L (ref 21–28)
HCT VFR BLD AUTO: 37.8 % (ref 35–47)
HGB BLD-MCNC: 12.6 G/DL (ref 11.7–15.7)
HOLD SPECIMEN: NORMAL
INR PPP: 1.13 (ref 0.85–1.15)
LACTATE BLD-SCNC: 2 MMOL/L
LACTATE SERPL-SCNC: 2.1 MMOL/L (ref 0.7–2)
LDH SERPL L TO P-CCNC: 503 U/L (ref 81–234)
LYMPHOCYTES # BLD MANUAL: 1.4 10E3/UL (ref 0.8–5.3)
LYMPHOCYTES NFR BLD MANUAL: 10 %
MAGNESIUM SERPL-MCNC: 2.6 MG/DL (ref 1.6–2.3)
MCH RBC QN AUTO: 30.8 PG (ref 26.5–33)
MCHC RBC AUTO-ENTMCNC: 33.3 G/DL (ref 31.5–36.5)
MCV RBC AUTO: 92 FL (ref 78–100)
MONOCYTES # BLD MANUAL: 0.4 10E3/UL (ref 0–1.3)
MONOCYTES NFR BLD MANUAL: 3 %
MYELOCYTES # BLD MANUAL: 0.3 10E3/UL
MYELOCYTES NFR BLD MANUAL: 2 %
NEUTROPHILS # BLD MANUAL: 12.1 10E3/UL (ref 1.6–8.3)
NEUTROPHILS NFR BLD MANUAL: 85 %
PCO2 BLDV: 38 MM HG (ref 40–50)
PH BLDV: 7.48 [PH] (ref 7.32–7.43)
PLAT MORPH BLD: ABNORMAL
PLATELET # BLD AUTO: 482 10E3/UL (ref 150–450)
PO2 BLDV: 25 MM HG (ref 25–47)
POTASSIUM BLD-SCNC: 3.2 MMOL/L (ref 3.4–5.3)
POTASSIUM BLD-SCNC: 3.8 MMOL/L (ref 3.4–5.3)
PROCALCITONIN SERPL-MCNC: 0.08 NG/ML
PROT SERPL-MCNC: 7.2 G/DL (ref 6.8–8.8)
RBC # BLD AUTO: 4.09 10E6/UL (ref 3.8–5.2)
RBC MORPH BLD: ABNORMAL
SAO2 % BLDV: 50 % (ref 94–100)
SARS-COV-2 RNA RESP QL NAA+PROBE: POSITIVE
SODIUM SERPL-SCNC: 137 MMOL/L (ref 133–144)
SPECIMEN EXPIRATION DATE: NORMAL
TOXIC GRANULES BLD QL SMEAR: PRESENT
TROPONIN I SERPL-MCNC: <0.015 UG/L (ref 0–0.04)
WBC # BLD AUTO: 14.2 10E3/UL (ref 4–11)

## 2021-09-10 PROCEDURE — XW033H5 INTRODUCTION OF TOCILIZUMAB INTO PERIPHERAL VEIN, PERCUTANEOUS APPROACH, NEW TECHNOLOGY GROUP 5: ICD-10-PCS | Performed by: INTERNAL MEDICINE

## 2021-09-10 PROCEDURE — 84484 ASSAY OF TROPONIN QUANT: CPT | Performed by: EMERGENCY MEDICINE

## 2021-09-10 PROCEDURE — C9803 HOPD COVID-19 SPEC COLLECT: HCPCS

## 2021-09-10 PROCEDURE — 250N000009 HC RX 250: Performed by: EMERGENCY MEDICINE

## 2021-09-10 PROCEDURE — 85014 HEMATOCRIT: CPT | Performed by: EMERGENCY MEDICINE

## 2021-09-10 PROCEDURE — 250N000011 HC RX IP 250 OP 636: Performed by: INTERNAL MEDICINE

## 2021-09-10 PROCEDURE — 71275 CT ANGIOGRAPHY CHEST: CPT

## 2021-09-10 PROCEDURE — 82040 ASSAY OF SERUM ALBUMIN: CPT | Performed by: EMERGENCY MEDICINE

## 2021-09-10 PROCEDURE — 85610 PROTHROMBIN TIME: CPT | Performed by: INTERNAL MEDICINE

## 2021-09-10 PROCEDURE — 84145 PROCALCITONIN (PCT): CPT | Performed by: EMERGENCY MEDICINE

## 2021-09-10 PROCEDURE — 84132 ASSAY OF SERUM POTASSIUM: CPT | Performed by: INTERNAL MEDICINE

## 2021-09-10 PROCEDURE — 96374 THER/PROPH/DIAG INJ IV PUSH: CPT | Mod: 59

## 2021-09-10 PROCEDURE — 85730 THROMBOPLASTIN TIME PARTIAL: CPT | Performed by: INTERNAL MEDICINE

## 2021-09-10 PROCEDURE — 250N000011 HC RX IP 250 OP 636: Performed by: EMERGENCY MEDICINE

## 2021-09-10 PROCEDURE — 99291 CRITICAL CARE FIRST HOUR: CPT

## 2021-09-10 PROCEDURE — 86140 C-REACTIVE PROTEIN: CPT | Performed by: INTERNAL MEDICINE

## 2021-09-10 PROCEDURE — 83615 LACTATE (LD) (LDH) ENZYME: CPT | Performed by: INTERNAL MEDICINE

## 2021-09-10 PROCEDURE — 272N000054 HC CANNULA HIGH FLOW, ADULT

## 2021-09-10 PROCEDURE — 36415 COLL VENOUS BLD VENIPUNCTURE: CPT | Performed by: EMERGENCY MEDICINE

## 2021-09-10 PROCEDURE — 999N000156 HC STATISTIC RCP CONSULT EA 30 MIN

## 2021-09-10 PROCEDURE — 999N000105 HC STATISTIC NO DOCUMENTATION TO SUPPORT CHARGE

## 2021-09-10 PROCEDURE — XW033E5 INTRODUCTION OF REMDESIVIR ANTI-INFECTIVE INTO PERIPHERAL VEIN, PERCUTANEOUS APPROACH, NEW TECHNOLOGY GROUP 5: ICD-10-PCS | Performed by: INTERNAL MEDICINE

## 2021-09-10 PROCEDURE — 250N000013 HC RX MED GY IP 250 OP 250 PS 637: Performed by: EMERGENCY MEDICINE

## 2021-09-10 PROCEDURE — 999N000157 HC STATISTIC RCP TIME EA 10 MIN

## 2021-09-10 PROCEDURE — 87635 SARS-COV-2 COVID-19 AMP PRB: CPT | Performed by: EMERGENCY MEDICINE

## 2021-09-10 PROCEDURE — 36415 COLL VENOUS BLD VENIPUNCTURE: CPT | Performed by: INTERNAL MEDICINE

## 2021-09-10 PROCEDURE — 258N000003 HC RX IP 258 OP 636: Performed by: INTERNAL MEDICINE

## 2021-09-10 PROCEDURE — 85384 FIBRINOGEN ACTIVITY: CPT | Performed by: INTERNAL MEDICINE

## 2021-09-10 PROCEDURE — 99223 1ST HOSP IP/OBS HIGH 75: CPT | Mod: AI | Performed by: INTERNAL MEDICINE

## 2021-09-10 PROCEDURE — 250N000013 HC RX MED GY IP 250 OP 250 PS 637: Performed by: INTERNAL MEDICINE

## 2021-09-10 PROCEDURE — 999N000215 HC STATISTIC HFNC ADULT NON-CPAP

## 2021-09-10 PROCEDURE — 86900 BLOOD TYPING SEROLOGIC ABO: CPT | Performed by: INTERNAL MEDICINE

## 2021-09-10 PROCEDURE — 120N000001 HC R&B MED SURG/OB

## 2021-09-10 PROCEDURE — 85379 FIBRIN DEGRADATION QUANT: CPT | Performed by: INTERNAL MEDICINE

## 2021-09-10 PROCEDURE — 82803 BLOOD GASES ANY COMBINATION: CPT

## 2021-09-10 PROCEDURE — 250N000009 HC RX 250: Performed by: INTERNAL MEDICINE

## 2021-09-10 PROCEDURE — 250N000012 HC RX MED GY IP 250 OP 636 PS 637: Performed by: INTERNAL MEDICINE

## 2021-09-10 PROCEDURE — 99207 PR APP CREDIT; MD BILLING SHARED VISIT: CPT | Performed by: INTERNAL MEDICINE

## 2021-09-10 PROCEDURE — 83735 ASSAY OF MAGNESIUM: CPT | Performed by: EMERGENCY MEDICINE

## 2021-09-10 PROCEDURE — 83605 ASSAY OF LACTIC ACID: CPT | Performed by: EMERGENCY MEDICINE

## 2021-09-10 RX ORDER — LIDOCAINE 40 MG/G
CREAM TOPICAL
Status: DISCONTINUED | OUTPATIENT
Start: 2021-09-10 | End: 2021-09-17 | Stop reason: HOSPADM

## 2021-09-10 RX ORDER — POTASSIUM CHLORIDE 20MEQ/15ML
20 LIQUID (ML) ORAL ONCE
Status: COMPLETED | OUTPATIENT
Start: 2021-09-10 | End: 2021-09-10

## 2021-09-10 RX ORDER — ACETAMINOPHEN 650 MG/1
650 SUPPOSITORY RECTAL EVERY 6 HOURS PRN
Status: DISCONTINUED | OUTPATIENT
Start: 2021-09-10 | End: 2021-09-17 | Stop reason: HOSPADM

## 2021-09-10 RX ORDER — DIPHENHYDRAMINE HCL 25 MG
25-50 TABLET ORAL EVERY 6 HOURS PRN
COMMUNITY

## 2021-09-10 RX ORDER — ALBUTEROL SULFATE 90 UG/1
1-2 AEROSOL, METERED RESPIRATORY (INHALATION) EVERY 4 HOURS PRN
Status: DISCONTINUED | OUTPATIENT
Start: 2021-09-10 | End: 2021-09-17 | Stop reason: HOSPADM

## 2021-09-10 RX ORDER — ALBUTEROL SULFATE 90 UG/1
2 AEROSOL, METERED RESPIRATORY (INHALATION) 4 TIMES DAILY
Status: DISCONTINUED | OUTPATIENT
Start: 2021-09-10 | End: 2021-09-10

## 2021-09-10 RX ORDER — DEXAMETHASONE SODIUM PHOSPHATE 10 MG/ML
6 INJECTION, SOLUTION INTRAMUSCULAR; INTRAVENOUS ONCE
Status: COMPLETED | OUTPATIENT
Start: 2021-09-10 | End: 2021-09-10

## 2021-09-10 RX ORDER — IOPAMIDOL 755 MG/ML
500 INJECTION, SOLUTION INTRAVASCULAR ONCE
Status: COMPLETED | OUTPATIENT
Start: 2021-09-10 | End: 2021-09-10

## 2021-09-10 RX ORDER — ALBUTEROL SULFATE 90 UG/1
2 AEROSOL, METERED RESPIRATORY (INHALATION) EVERY 6 HOURS PRN
Status: DISCONTINUED | OUTPATIENT
Start: 2021-09-10 | End: 2021-09-17 | Stop reason: HOSPADM

## 2021-09-10 RX ORDER — ACETAMINOPHEN 325 MG/1
650 TABLET ORAL EVERY 6 HOURS PRN
Status: DISCONTINUED | OUTPATIENT
Start: 2021-09-10 | End: 2021-09-17 | Stop reason: HOSPADM

## 2021-09-10 RX ADMIN — REMDESIVIR 200 MG: 100 INJECTION, POWDER, LYOPHILIZED, FOR SOLUTION INTRAVENOUS at 11:07

## 2021-09-10 RX ADMIN — ENOXAPARIN SODIUM 40 MG: 40 INJECTION SUBCUTANEOUS at 14:00

## 2021-09-10 RX ADMIN — DEXAMETHASONE 6 MG: 2 TABLET ORAL at 14:00

## 2021-09-10 RX ADMIN — DEXAMETHASONE SODIUM PHOSPHATE 6 MG: 10 INJECTION, SOLUTION INTRAMUSCULAR; INTRAVENOUS at 04:20

## 2021-09-10 RX ADMIN — TOCILIZUMAB 500 MG: 20 INJECTION, SOLUTION, CONCENTRATE INTRAVENOUS at 09:52

## 2021-09-10 RX ADMIN — ALBUTEROL SULFATE 2 PUFF: 90 AEROSOL, METERED RESPIRATORY (INHALATION) at 16:57

## 2021-09-10 RX ADMIN — POTASSIUM CHLORIDE 20 MEQ: 1.5 SOLUTION ORAL at 05:52

## 2021-09-10 RX ADMIN — SODIUM CHLORIDE 78 ML: 9 INJECTION, SOLUTION INTRAVENOUS at 04:30

## 2021-09-10 RX ADMIN — SODIUM CHLORIDE 300 ML: 9 INJECTION, SOLUTION INTRAVENOUS at 11:07

## 2021-09-10 RX ADMIN — IOPAMIDOL 56 ML: 755 INJECTION, SOLUTION INTRAVENOUS at 04:31

## 2021-09-10 ASSESSMENT — ENCOUNTER SYMPTOMS
FEVER: 1
SHORTNESS OF BREATH: 1

## 2021-09-10 ASSESSMENT — ACTIVITIES OF DAILY LIVING (ADL)
ADLS_ACUITY_SCORE: 13
ADLS_ACUITY_SCORE: 13

## 2021-09-10 NOTE — ED NOTES
[facility]  ED Nurse Handoff Report    Henrietta Conner is a 61 year old female   ED Chief complaint: Shortness of Breath  . ED Diagnosis:   Final diagnoses:   None     Allergies:   Allergies   Allergen Reactions     Omnicef [Cefdinir] GI Disturbance     And vaginal yeast infection       Code Status: Full Code  Activity level - Baseline/Home:  Independent. Activity Level - Current:   Stand by Assist. Lift room needed: No. Bariatric: No   Needed: No   Isolation: Yes. Infection: Not Applicable  COVID r/o and special precautions  Other .     Vital Signs:   Vitals:    09/10/21 0410 09/10/21 0415 09/10/21 0420 09/10/21 0430   BP:  120/77  109/76   Pulse: 100 101 96 96   Resp:       Temp:       TempSrc:       SpO2: 96% 94% 94% 97%       Cardiac Rhythm:  ,      Pain level:    Patient confused: No. Patient Falls Risk: No.   Elimination Status: Unable to void   Patient Report - Initial Complaint: SOB. Focused Assessment: Pt in moderate respiratory distress, reports her  is COVID positive and she has only had 1 vaccine dose about 2 weeks ago. With 9L on oxy mask she speak in 6-7 word sentences and appears fairly comfortable with sats of 93-95.  Tests Performed: CT, blood. Abnormal Results: Not back at time of writing.   Treatments provided: decadron, oxygen  Family Comments: called  Farrukh with update  OBS brochure/video discussed/provided to patient:  NA  ED Medications:   Medications   sodium chloride (PF) 0.9% PF flush 3 mL (has no administration in time range)   sodium chloride (PF) 0.9% PF flush 3 mL (has no administration in time range)   potassium chloride (KAYCIEL) solution 20 mEq (has no administration in time range)   dexamethasone PF (DECADRON) injection 6 mg (6 mg Intravenous Given 9/10/21 0420)   CT Flush 100mL Saline (78 mLs Intravenous Given 9/10/21 0430)   iopamidol (ISOVUE-370) solution 500 mL (56 mLs Intravenous Given 9/10/21 0431)     Drips infusing:  No  For the  majority of the shift, the patient's behavior Green. Interventions performed were NA.    Sepsis treatment initiated: No     Patient tested for COVID 19 prior to admission: YES    ED Nurse Name/Phone Number: Ct Joy RN,   4:40 AM    RECEIVING UNIT ED HANDOFF REVIEW    Above ED Nurse Handoff Report was reviewed: Yes  Reviewed by: Abby Reyes RN on September 10, 2021 at 12:21 PM

## 2021-09-10 NOTE — PROVIDER NOTIFICATION
MD [id number]:  Pt has remdesivir due at 1700, pt just finished dose that was started in the ED at 1100 am.  Do you want me to hold the scheduled dose this evening for 1700?     Per MD have Pharmacy change due time.    MD Paged 164: Pt stating 88-90% on 15 Liters oxymask at rest. Please advise thank you    MD Paged [phone]: Pt stating at 88-90% at rest on 15 LPMs oxymask, please advise.  Pt doesn't report any SOB at this time.      1710: MD ordered Hiflow NC.    MD Paged 1711:  Thank you, FYSILVINA pt self proned and oxygen improved to 94%, but still unable to maintain sats while laying supine, or sitting in bed.      RT Paged to start Hiflow NC

## 2021-09-10 NOTE — PLAN OF CARE
/56 (BP Location: Left arm)   Pulse 83   Temp 97.5  F (36.4  C) (Oral)   Resp 24   Wt 61.6 kg (135 lb 14.4 oz)   LMP 06/01/2006   SpO2 92%   BMI 21.61 kg/m       Admitted this shift from ED. Pt A/Ox4. SBA/Ind transferred without assistance. Need assistance if SOB.regular diet. Denies pain. 11L oxymask. K: 3.5- AM- redraw scheduled. WBC: 14.2. Tele: SR. Skin CDI. LS Crackles

## 2021-09-10 NOTE — PHARMACY-ADMISSION MEDICATION HISTORY
Admission medication history interview status for this patient is complete. See [facility] admission navigator for allergy information, prior to admission medications and immunization status.     Medication history interview done, indicate source(s): Patient over phone  Medication history resources (including written lists, pill bottles, clinic record):None    Changes made to PTA medication list:  Added: benadryl  Changed: none  Reported as Not Taking: none  Removed: none    Actions taken by pharmacist (provider contacted, etc):None     Additional medication history information:None    Medication reconciliation/reorder completed by provider prior to medication history?  N   (Y/N)     Prior to Admission medications    Medication Sig Last Dose Taking? Auth Provider   albuterol (PROAIR HFA/PROVENTIL HFA/VENTOLIN HFA) 108 (90 Base) MCG/ACT inhaler Inhale 1-2 puffs into the lungs every 4 hours as needed for shortness of breath / dyspnea or wheezing  Yes Tati Oakley, NICOLE   Aspirin-Acetaminophen-Caffeine (EXCEDRIN PO) Take by mouth as needed  Yes Reported, Patient   BIOTIN PO Take 2,500 mg by mouth daily  Past Month at Unknown time Yes Reported, Patient   calcium carbonate-vitamin D 600-400 MG-UNIT CHEW Take 1 chew tab by mouth 2 times daily Past Month at Unknown time Yes Reported, Patient   diphenhydrAMINE (BENADRYL) 25 MG tablet Take 25-50 mg by mouth every 6 hours as needed for itching or allergies  Yes Unknown, Entered By History   FISH OIL 1000 MG OR CAPS 2 capsules daily Past Month at Unknown time Yes Reported, Patient   GLUCOSAMINE OR 2 tablets daily  Past Month at Unknown time Yes Reported, Patient   MAGNESIUM OXIDE PO Take 500 mg by mouth daily  Past Month at Unknown time Yes Reported, Patient   MULTI-VITAMIN OR TABS 1 tablet daily Past Month at Unknown time Yes Reported, Patient   TURMERIC PO  Past Month at Unknown time Yes Reported, Patient

## 2021-09-10 NOTE — PROGRESS NOTES
[facility]  Hospitalist Progress Note  Gilberto Laureano MD 09/10/2021    Reason for Stay (Diagnosis): Acute hypoxic resp failure due to COVID pneumonia         Assessment and Plan:      Summary of Stay: Henrietta Conner is a 61 year old female admitted on 9/10/2021 with covid pneumonia.      Hx PE assoc wth smoking and OCPs.  Now non-smoker x 10 yrs, but underlying emphysematous changes ion CT.      I met Ms. Conner in the emergency department shortly after she was admitted by Nichelle Alanis MD.  She had required a rapid titration up on her oxygen flow to the point where she was saturating in the low 90s on 11 L/min.  Fortunately, the patient appeared more comfortable than her numbers would belie.  Nevertheless I felt it was important to start her on both remdesivir and tocilizumab in view of the initial CRP greater than 300.  I note the patient also has a history of COPD and that is evident on CT scan.    Unvaccinated  Onset of symptoms: about 8/29/2021  Diagnosis of Covid: 9/7/2021    Problem List:   1. Acute hypoxic resp failure due to Covid Pneumonia/ARDS.  After the patient arrived on the medical floor, she was noted to be hypoxic despite being on 15 L by nonrebreather facemask.  2. COPD.  Emphysematous changes are noted on the CT scan but the patient has not required chronic inhaler therapy prior to this time.  3. Hypokalemia.    Plan:  1.  Start high flow nasal cannula for support.  2.  Tocilizumab was added to Decadron and remdesivir.  3.  Patient notes that she had been given a bronchodilator inhaler by her primary physician a couple of days ago.  That seems to help with cough and she would like it now.  4.  Electrolyte monitoring and replacement as indicated.    DVT Prophylaxis: Enoxaparin (Lovenox) SQ  Code Status: Full Code  Discharge Dispo: TBD  Estimated Disch Date / # of Days until Disch: TBD, likely more than 3 days        Interval History (Subjective):      Chart reviewed, pt  interviewed.      As noted above, Ms. Conner indicates she has been extremely short of breath with activity but otherwise is relatively comfortable at rest.  Her appetite has dropped off over the course the last week or so.  She feels extremely exhausted.                    Physical Exam:      Last Vital Signs:  /68   Pulse 94   Temp 98.3  F (36.8  C) (Temporal)   Resp 26   LMP 06/01/2006   SpO2 94%     I/O last 3 completed shifts:  In: 250 [IV Piggyback:250]  Out: -     Constitutional: Awake, alert, cooperative, no apparent distress   Respiratory: Clear to auscultation bilaterally, posterior crackles.  No wheeze.   Cardiovascular: Regular rate and rhythm, normal S1 and S2, and no murmur noted   Abdomen: Normal bowel sounds, soft, non-distended, non-tender   Skin: No rashes, no cyanosis, dry to touch   Neuro: Alert and oriented x3.   Extremities: No edema.   Other(s):        All other systems: Negative          Medications:      All current medications were reviewed with changes reflected in problem list.         Data:      All new lab and imaging data was reviewed.   Labs/Imaging:  Results for orders placed or performed during the hospital encounter of 09/10/21 (from the past 24 hour(s))   [place] Draw    Narrative    The following orders were created for panel order [place] Draw.  Procedure                               Abnormality         Status                     ---------                               -----------         ------                     Extra Blue Top Tube[[id number]]                              Final result               Extra Red Top Tube[[id number]]                               Final result               Extra Green Top (Lithium...[[id number]]                      Final result               Extra Purple Top Tube[[id number]]                            Final result                 Please view results for these tests on the individual orders.   Extra Blue Top Tube   Result Value Ref Range     Hold Specimen JIC    Extra Red Top Tube   Result Value Ref Range    Hold Specimen JIC    Extra Green Top (Lithium Heparin) Tube   Result Value Ref Range    Hold Specimen JIC    Extra Purple Top Tube   Result Value Ref Range    Hold Specimen JIC    CBC with platelets differential    Narrative    The following orders were created for panel order CBC with platelets differential.  Procedure                               Abnormality         Status                     ---------                               -----------         ------                     CBC with platelets and d...[[id number]]  Abnormal            Final result               Manual Differential[[id number]]          Abnormal            Final result                 Please view results for these tests on the individual orders.   Comprehensive metabolic panel   Result Value Ref Range    Sodium 137 133 - 144 mmol/L    Potassium 3.2 (L) 3.4 - 5.3 mmol/L    Chloride 102 94 - 109 mmol/L    Carbon Dioxide (CO2) 30 20 - 32 mmol/L    Anion Gap 5 3 - 14 mmol/L    Urea Nitrogen 14 7 - 30 mg/dL    Creatinine 0.66 0.52 - 1.04 mg/dL    Calcium 9.3 8.5 - 10.1 mg/dL    Glucose 128 (H) 70 - 99 mg/dL    Alkaline Phosphatase 92 40 - 150 U/L    AST 67 (H) 0 - 45 U/L    ALT 56 (H) 0 - 50 U/L    Protein Total 7.2 6.8 - 8.8 g/dL    Albumin 2.6 (L) 3.4 - 5.0 g/dL    Bilirubin Total 0.4 0.2 - 1.3 mg/dL    GFR Estimate >90 >60 mL/min/1.73m2   Magnesium   Result Value Ref Range    Magnesium 2.6 (H) 1.6 - 2.3 mg/dL   Troponin I   Result Value Ref Range    Troponin I <0.015 0.000 - 0.045 ug/L   Procalcitonin   Result Value Ref Range    Procalcitonin 0.08 <5.00 ng/mL   CBC with platelets and differential   Result Value Ref Range    WBC Count 14.2 (H) 4.0 - 11.0 10e3/uL    RBC Count 4.09 3.80 - 5.20 10e6/uL    Hemoglobin 12.6 11.7 - 15.7 g/dL    Hematocrit 37.8 35.0 - 47.0 %    MCV 92 78 - 100 fL    MCH 30.8 26.5 - 33.0 pg    MCHC 33.3 31.5 - 36.5 g/dL    RDW 12.6 10.0 - 15.0 %     Platelet Count 482 (H) 150 - 450 10e3/uL   Manual Differential   Result Value Ref Range    % Neutrophils 85 %    % Lymphocytes 10 %    % Monocytes 3 %    % Eosinophils 0 %    % Basophils 0 %    % Myelocytes 2 %    Absolute Neutrophils 12.1 (H) 1.6 - 8.3 10e3/uL    Absolute Lymphocytes 1.4 0.8 - 5.3 10e3/uL    Absolute Monocytes 0.4 0.0 - 1.3 10e3/uL    Absolute Eosinophils 0.0 0.0 - 0.7 10e3/uL    Absolute Basophils 0.0 0.0 - 0.2 10e3/uL    Absolute Myelocytes 0.3 (H) <=0.0 10e3/uL    RBC Morphology Confirmed RBC Indices     Platelet Assessment  Automated Count Confirmed. Platelet morphology is normal.     Automated Count Confirmed. Platelet morphology is normal.    Toxic Neutrophils Present (A) None Seen   CRP inflammation   Result Value Ref Range    CRP Inflammation 305.0 (H) 0.0 - 8.0 mg/L   iStat Gases (lactate) venous, POCT   Result Value Ref Range    Lactic Acid POCT 2.0 <=2.0 mmol/L    Bicarbonate Venous POCT 28 21 - 28 mmol/L    O2 Sat, Venous POCT 50 (L) 94 - 100 %    pCO2V Venous POCT 38 (L) 40 - 50 mm Hg    pH Venous POCT 7.48 (H) 7.32 - 7.43    pO2 Venous POCT 25 25 - 47 mm Hg   Lactic acid whole blood   Result Value Ref Range    Lactic Acid 2.1 (H) 0.7 - 2.0 mmol/L   Symptomatic COVID-19 Virus (Coronavirus) by PCR Nasopharyngeal    Specimen: Nasopharyngeal; Swab   Result Value Ref Range    SARS CoV2 PCR Positive (A) Negative    Narrative    Testing was performed using the jaspal  SARS-CoV-2 & Influenza A/B Assay on the jaspal  Mckenna  System.  This test should be ordered for the detection of SARS-COV-2 in individuals who meet SARS-CoV-2 clinical and/or epidemiological criteria. Test performance is unknown in asymptomatic patients.  This test is for in vitro diagnostic use under the FDA EUA for laboratories certified under CLIA to perform moderate and/or high complexity testing. This test has not been FDA cleared or approved.  A negative test does not rule out the presence of PCR inhibitors in the specimen  or target RNA in concentration below the limit of detection for the assay. The possibility of a false negative should be considered if the patient's recent exposure or clinical presentation suggests COVID-19.  [facility] are certified under the Clinical Laboratory Improvement Amendments of 1988 (CLIA-88) as qualified to perform moderate and/or high complexity laboratory testing.   CT Chest Pulmonary Embolism w Contrast    Narrative    EXAM: CT CHEST PULMONARY EMBOLISM W CONTRAST  LOCATION: [facility]  DATE/TIME: 9/10/2021 4:29 AM    INDICATION: Hypoxia.  COMPARISON: 01/12/2021.  TECHNIQUE: CT chest pulmonary angiogram during arterial phase injection of IV contrast. Multiplanar reformats and MIP reconstructions were performed. Dose reduction techniques were used.   CONTRAST: 56 mL Isovue-370.    FINDINGS:  ANGIOGRAM CHEST: Pulmonary arteries are normal caliber and negative for pulmonary emboli. Thoracic aorta is negative for dissection. No CT evidence of right heart strain.    LUNGS AND PLEURA: Emphysematous changes in the lungs. Moderately prominent areas of patchy consolidation in both lower lobes with some scattered nodular areas of consolidation elsewhere. There is also a background of patchy groundglass opacities in the   lungs with findings concerning for underlying pneumonitis. COVID-19 pneumonitis could have this appearance. Clinical correlation. Given some patchy nodular areas of consolidation, follow-up imaging is recommended to monitor for resolution. Numerous   benign calcified granulomas throughout both lungs. Trace amount of pleural fluid bilaterally.    MEDIASTINUM/AXILLAE: Scattered low-density hilar and mediastinal lymph nodes are likely reactive in nature. Normal heart size. No pericardial effusion. Normal caliber thoracic aorta. Esophagus is grossly negative.    CORONARY ARTERY CALCIFICATION: None.    UPPER ABDOMEN: Normal.    MUSCULOSKELETAL: Normal.       Impression    IMPRESSION:  1.  Negative for pulmonary embolism.    2.  Combination of patchy areas of consolidation as well as more diffuse groundglass infiltrate with an appearance suggestive of pneumonitis and could be seen with COVID-19 pneumonitis. Clinical correlation.    3.  Emphysematous changes in the lungs. Given some nodular areas of consolidation, recommend follow-up chest CT in 3 months for reevaluation following appropriate therapy to monitor for resolution and exclude any underlying lesions.    4.  Evidence of prior granulomatous disease.    5.  Trace amount of pleural fluid bilaterally.               INR   Result Value Ref Range    INR 1.13 0.85 - 1.15   Partial thromboplastin time   Result Value Ref Range    aPTT 30 22 - 38 Seconds   Fibrinogen activity   Result Value Ref Range    Fibrinogen Activity 616 (H) 170 - 490 mg/dL   D dimer quantitative   Result Value Ref Range    D-Dimer Quantitative 0.62 (H) 0.00 - 0.50 ug/mL FEU    Narrative    This D-dimer assay is intended for use in conjunction with a clinical pretest probability assessment model to exclude pulmonary embolism (PE) and deep venous thrombosis (DVT) in outpatients suspected of PE or DVT. The cut-off value is 0.50 ug/mL FEU.   ABO and Rh   Result Value Ref Range    ABO/RH(D) A POS     SPECIMEN EXPIRATION DATE [id number]    Potassium   Result Value Ref Range    Potassium 3.8 3.4 - 5.3 mmol/L   Lactate Dehydrogenase   Result Value Ref Range    Lactate Dehydrogenase 503 (H) 81 - 234 U/L

## 2021-09-10 NOTE — H&P
Admitted: 09/10/2021    CHIEF COMPLAINT:  Shortness of breath, has been recently tested positive for COVID.    HISTORY OF PRESENT ILLNESS:  This is a 61-year-old white female with no significant past medical history who presents with shortness of breath.  She states that she has been having progressively worsening shortness of breath for the past 2 weeks.  She denies any productive cough.  She denies any fevers or chills.  She has been in contact with her  who tested positive for COVID roughly a week ago and at that time when she was having fevers and shortness of breath, she was prescribed over-the-counter prednisone.  When checking her O2 sat through a portable pulse oximeter, it was registering as low, hence she was coaxed by her  to come into the ER.  In the ER over here, she is seen by Dr. Che.  She is noted to have acute hypoxic respiratory failure.  I am asked to admit her for further evaluation.    PAST MEDICAL HISTORY:  Endometriosis, provoked pulmonary embolus, hyperlipidemia.    PAST SURGICAL HISTORY:  Significant for laparoscopic surgery, hysterectomy, bladder repair.    FAMILY HISTORY:  Significant for mother who  and had cerebrovascular accident.    HOME MEDICATION LIST:  She does not take any prescription meds.  She takes over-the-counter meds.    REVIEW OF SYSTEMS:  As mentioned in the HPI, she denies any nausea, vomiting, diarrhea, abdominal pain, change in taste or smell.    SOCIAL HISTORY:  She used to be a smoker, has a 40-pack-year history of smoking, has since quit smoking.  She does not drink alcohol.  She is .    REVIEW OF SYSTEMS:  As mentioned above, all other systems extensively reviewed and deemed unremarkable and negative.    PHYSICAL EXAMINATION:    VITAL SIGNS:  Her temperature is 98.3, pulse is 104, blood pressure is 107/92, respiratory rate of 26, O2 sat is 93% on 11 liters.  GENERAL:  She is alert, awake, oriented, coherent, lying on the  diannejulio cesar.  HEENT:  Pupils equal, round, reactive to light.  LUNGS:  She has a few coarse crackles on the bases bilaterally.  HEART:  Regular rate.  S1, S2 normal.  No murmurs or gallops.  ABDOMEN:  Soft, nontender, nondistended with hypoactive bowel sounds.  EXTREMITIES:  There is no edema.  SKIN:  There is no rash.  NEUROLOGICAL:  She moves all extremities.    LABORATORY DATA:  Obtained here included a CMP which is grossly unremarkable other than a potassium of 3.2, magnesium 2.6, albumin 2.6.  Her ALT is 56 and her AST is 67.  Lactic acid was 2.1.  Troponin is less than 0.015.  Her venous blood gas showed a pH of 7.48 with a pCO2 of 38.  On a CBC, her white cell count is 14.2 with a platelet count of 482.  SARS COVID test was read as positive.  CT chest PE protocol was negative for PE, combination of patchy areas of consolidation as well as more diffuse ground-glass infiltrate with an appearance suggestive of pneumonitis and could be seen with COVID-19 pneumonitis.  Clinical correlation.  Emphysematous changes in the tree.  Given some nodular areas of consultation, I recommend a followup chest CT in 3 months for reevaluation following appropriate therapy to monitor for resolution and exclude any underlying lesions, evidence of prior granulomatous disease, trace amount of pleural fluid bilaterally.    IMPRESSION AND PLAN:  Acute hypoxic respiratory failure, likely due to COVID pneumonia, cannot rule out superimposed bacterial infection.  We will admit her as an inpatient.  We will treat her with IV Decadron as well as remdesivir.  I did discuss with her the utility of remdesivir and she is agreeable.  We will monitor her inflammatory markers such as CRP.  We will place her on adequate prophylactic anticoagulation with Lovenox.  A procalcitonin is pending.  If it is abnormally elevated, it may be worthwhile considering antibiotics as well.    CODE STATUS:  Full code.    She will be admitted as an inpatient.    Nichelle  SIENA Alanis MD        D: 09/10/2021   T: 09/10/2021   MT: md/JUSTIN    Name:     MANOLO ROSA  MRN:      [id number]        Account:     [id number]   :      1960           Admitted:    09/10/2021       Document: [id number]

## 2021-09-10 NOTE — ED PROVIDER NOTES
I received sign out from Dr. Che.  Please refer to their H&P for further information.  In brief, patient diagnosed with covid.  Requiring 11 L on face mask.  Given decadron.  Awaiting admission.       Mariah Iyer MD  09/10/21 [id number]

## 2021-09-10 NOTE — ED NOTES
Pt stood up independently to stand on scale. After she laid back down she stated she was mildly SOB. Pt's O2 dropped to 80%. Oxygen turned up to 15L via oxymask. Pt returned to 92% after approx. 1 minute and O2 returned to 11L via oxymask.

## 2021-09-10 NOTE — PROGRESS NOTES
Respiratory Therapy Note    The HFNC was applied patient @ 35LPM and 90% for PEEP therapy per MD order at 1730. Skin integrity looks good at this time. RR 20s, breath sounds diminished with crackles, and SpO2 92-94%. Patient will continue to receive Albuterol MDI QID. RT to follow.     Citlalli Chavez, RT  5:30 PM September 10, 2021

## 2021-09-10 NOTE — ED PROVIDER NOTES
"  History     Chief Complaint:  Shortness of Breath     HPI   Henrietta Conner is a 61 year old female with history of hyperlipidemia who presents with shortness of breath. The patient reports that she has been short of breath at home, and that oxygen saturations have been \"low\" on her pulse oximeter. She received the first dose of the COVID vaccine on August 27th and her  subsequently tested positive for COVID on September 1st. She started developing fever and shortness of breath at around the same time as her . Patient is currently on day three of prednisone for treatment of current symptoms. Oxygen saturation was measured at 60% in triage this evening.     Review of Systems   Constitutional: Positive for fever.   Respiratory: Positive for shortness of breath.    All other systems reviewed and are negative.    Allergies:  Omnicef    Medications:  Albuterol  Magnesium  Prednisone     Past Medical History:    Endometriosis  Hyperlipidemia  Pulmonary embolism      Past Surgical History:    Hysterectomy  Laparoscopy for endometriosis  Bladder repair     Family History:    CVD, mother  Arthritis, mother  Hypertension, mother  Diabetes, mother  Lipids, mother  Diabetes, father  Hypertension, father  Arthritis, father    Social History:  Arrives via car  Unaccompanied in ED    Physical Exam     Patient Vitals for the past 24 hrs:   BP Temp Temp src Pulse Resp SpO2   09/10/21 0600 107/92 -- -- 104 -- 93 %   09/10/21 0545 118/78 -- -- 102 -- --   09/10/21 0530 -- -- -- 96 -- 94 %   09/10/21 0500 113/70 -- -- 89 -- 94 %   09/10/21 0430 109/76 -- -- 96 -- 97 %   09/10/21 0420 -- -- -- 96 -- 94 %   09/10/21 0415 120/77 -- -- 101 -- 94 %   09/10/21 0410 -- -- -- 100 -- 96 %   09/10/21 0409 -- -- -- 100 -- 95 %   09/10/21 0408 -- -- -- 103 -- 95 %   09/10/21 0407 -- -- -- 101 -- 94 %   09/10/21 0406 -- -- -- -- -- 90 %   09/10/21 0405 122/76 -- -- -- -- 87 %   09/10/21 0404 -- -- -- -- -- 85 %   09/10/21 0402 " -- -- -- -- -- 77 %   09/10/21 0355 136/87 98.3  F (36.8  C) Temporal 104 26 61 %     Physical Exam  Nursing note and vitals reviewed.  Constitutional: Cooperative.   HENT:   Mouth/Throat: Mucous membranes are normal.   Cardiovascular: Tachycardic, regular rhythm and normal heart sounds.  No murmur.  Pulmonary/Chest:. Faint crackles in the lungs. Slight increased work of breathing.    Abdominal: Soft. Normal appearance and bowel sounds are normal. No distension. There is no tenderness. There is no rigidity and no guarding.   Musculoskeletal: No LE edema  Neurological: Alert. Oriented x4  Skin: Skin is warm and dry.   Psychiatric: Normal mood and affect.     Emergency Department Course     Imaging:    CT Chest Pulmonary Embolism w Contrast    1.  Negative for pulmonary embolism.    2.  Combination of patchy areas of consolidation as well as more diffuse groundglass infiltrate with an appearance suggestive of pneumonitis and could be seen with COVID-19 pneumonitis. Clinical correlation.    3.  Emphysematous changes in the lungs. Given some nodular areas of consolidation, recommend follow-up chest CT in 3 months for reevaluation following appropriate therapy to monitor for resolution and exclude any underlying lesions.    4.  Evidence of prior granulomatous disease.    5.  Trace amount of pleural fluid bilaterally.        Reads per radiology     Laboratory:    Lactic acid (result time 0433) 2.1 (H)    Symptomatic Influenza A/B & SARS-CoV2 (COVID19) Virus PCR Multiplex: positive (A)    ISTAT gases lactate doreen POCT (Collected 0412): O2 saturation 50 (L), pCO2 38 (L), pH 7.48 (H)    CBC: WBC 14.2 (H), HGB 12.6,  (H)   CMP: potassium 3.2 (L), glucose 128 (H), AST 67 (H), ALT 56 (H), albumin 2.6 (L) o/w WNL (Creatinine 0.66)     Magnesium: 2.6 (H)    Troponin (Collected 0408): <0.015    Procalcitonin: in process    Reviewed:  I reviewed nursing notes, vitals, past medical history and care  everywhere    Assessments:  0402 I obtained history and examined the patient as noted above.   0600 I rechecked the patient and explained findings.     Interventions:  0420 Decadron 6 mg IV  0552 Potassium chloride 20 mEq PO    Disposition:  The patient was admitted to the hospital under the care of Dr. Alanis.     Impression & Plan     Medical Decision Making:  Ms. Jerez is a 61 year old female partially vaccinated against COVID 19 who presents with significant hypoxia, fever and cough. Exam consistent with bilateral viral COVID pneumonia. No pulmonary embolism on CT scan. She currently has an oxygen requirement of 10 liters by facemask. Decadron administered and she will be admitted for respiratory care management of COVID19 pneumonia.     Covid-19  Henrietta Conner was evaluated during a global COVID-19 pandemic, which necessitated consideration that the patient might be at risk for infection with the SARS-CoV-2 virus that causes COVID-19.   Applicable protocols for evaluation were followed during the patient's care.   COVID-19 was considered as part of the patient's evaluation. The plan for testing is:  a test was obtained during this visit.    Diagnosis:    ICD-10-CM    1. Pneumonia due to 2019 novel coronavirus  U07.1     J12.82    2. Hypoxia  R09.02    3. Elevated lactic acid level  R79.89    4. Hypokalemia  E87.6        Scribe Disclosure:  José COOL, am serving as a scribe at 4:03 AM on 9/10/2021 to document services personally performed by Brayan Che MD based on my observations and the provider's statements to me.            Brayan Che MD  09/10/21 0684

## 2021-09-11 LAB
ALBUMIN SERPL-MCNC: 2.2 G/DL (ref 3.4–5)
ALP SERPL-CCNC: 80 U/L (ref 40–150)
ALT SERPL W P-5'-P-CCNC: 59 U/L (ref 0–50)
ANION GAP SERPL CALCULATED.3IONS-SCNC: 4 MMOL/L (ref 3–14)
AST SERPL W P-5'-P-CCNC: 43 U/L (ref 0–45)
BILIRUB DIRECT SERPL-MCNC: 0.1 MG/DL (ref 0–0.2)
BILIRUB SERPL-MCNC: 0.3 MG/DL (ref 0.2–1.3)
BUN SERPL-MCNC: 28 MG/DL (ref 7–30)
CALCIUM SERPL-MCNC: 8.7 MG/DL (ref 8.5–10.1)
CHLORIDE BLD-SCNC: 109 MMOL/L (ref 94–109)
CO2 SERPL-SCNC: 28 MMOL/L (ref 20–32)
CREAT SERPL-MCNC: 0.71 MG/DL (ref 0.52–1.04)
CRP SERPL-MCNC: 150 MG/L (ref 0–8)
D DIMER PPP FEU-MCNC: 0.59 UG/ML FEU (ref 0–0.5)
ERYTHROCYTE [DISTWIDTH] IN BLOOD BY AUTOMATED COUNT: 12.5 % (ref 10–15)
GFR SERPL CREATININE-BSD FRML MDRD: >90 ML/MIN/1.73M2
GLUCOSE BLD-MCNC: 146 MG/DL (ref 70–99)
HCT VFR BLD AUTO: 35.8 % (ref 35–47)
HGB BLD-MCNC: 11.1 G/DL (ref 11.7–15.7)
MCH RBC QN AUTO: 29.3 PG (ref 26.5–33)
MCHC RBC AUTO-ENTMCNC: 31 G/DL (ref 31.5–36.5)
MCV RBC AUTO: 95 FL (ref 78–100)
PLATELET # BLD AUTO: 497 10E3/UL (ref 150–450)
POTASSIUM BLD-SCNC: 3.8 MMOL/L (ref 3.4–5.3)
PROT SERPL-MCNC: 6.1 G/DL (ref 6.8–8.8)
RBC # BLD AUTO: 3.79 10E6/UL (ref 3.8–5.2)
SODIUM SERPL-SCNC: 141 MMOL/L (ref 133–144)
WBC # BLD AUTO: 12.5 10E3/UL (ref 4–11)

## 2021-09-11 PROCEDURE — 258N000003 HC RX IP 258 OP 636: Performed by: INTERNAL MEDICINE

## 2021-09-11 PROCEDURE — 82248 BILIRUBIN DIRECT: CPT | Performed by: INTERNAL MEDICINE

## 2021-09-11 PROCEDURE — 86140 C-REACTIVE PROTEIN: CPT | Performed by: INTERNAL MEDICINE

## 2021-09-11 PROCEDURE — 999N000215 HC STATISTIC HFNC ADULT NON-CPAP

## 2021-09-11 PROCEDURE — 120N000001 HC R&B MED SURG/OB

## 2021-09-11 PROCEDURE — 36415 COLL VENOUS BLD VENIPUNCTURE: CPT | Performed by: INTERNAL MEDICINE

## 2021-09-11 PROCEDURE — 85027 COMPLETE CBC AUTOMATED: CPT | Performed by: INTERNAL MEDICINE

## 2021-09-11 PROCEDURE — 85379 FIBRIN DEGRADATION QUANT: CPT | Performed by: INTERNAL MEDICINE

## 2021-09-11 PROCEDURE — 999N000157 HC STATISTIC RCP TIME EA 10 MIN

## 2021-09-11 PROCEDURE — 250N000011 HC RX IP 250 OP 636: Performed by: INTERNAL MEDICINE

## 2021-09-11 PROCEDURE — 250N000009 HC RX 250: Performed by: INTERNAL MEDICINE

## 2021-09-11 PROCEDURE — 99233 SBSQ HOSP IP/OBS HIGH 50: CPT | Performed by: INTERNAL MEDICINE

## 2021-09-11 PROCEDURE — 80048 BASIC METABOLIC PNL TOTAL CA: CPT | Performed by: INTERNAL MEDICINE

## 2021-09-11 PROCEDURE — 250N000012 HC RX MED GY IP 250 OP 636 PS 637: Performed by: INTERNAL MEDICINE

## 2021-09-11 RX ADMIN — SODIUM CHLORIDE 50 ML: 9 INJECTION, SOLUTION INTRAVENOUS at 18:33

## 2021-09-11 RX ADMIN — REMDESIVIR 100 MG: 100 INJECTION, POWDER, LYOPHILIZED, FOR SOLUTION INTRAVENOUS at 16:42

## 2021-09-11 RX ADMIN — ENOXAPARIN SODIUM 30 MG: 30 INJECTION SUBCUTANEOUS at 12:38

## 2021-09-11 RX ADMIN — DEXAMETHASONE 6 MG: 2 TABLET ORAL at 12:38

## 2021-09-11 ASSESSMENT — ACTIVITIES OF DAILY LIVING (ADL)
ADLS_ACUITY_SCORE: 13

## 2021-09-11 NOTE — PROGRESS NOTES
RT Note:    Patient on HFNC throughout day,unable to wean FiO2. Patient desating with eating and vigorous movement in bed. FiO2 % today but patient has no complaints of shortness of breath RR low 20's. Patient is self proning at times. SpO2 90-94%. BS diminished. RT to follow.

## 2021-09-11 NOTE — PLAN OF CARE
Pt A/Ox4, up with SBA to BSC. Pt denies pain, N/V, SOB, lightheadedness, and dizziness. Pt loss IV access.  VSS ex SPO2, pt on HFNC FiO2 90% on 35 Liters, SPo2 92-97% at rest.  With activity pt desaturates down to mid to high 80's%.  Pt intermittently self proning and laying sideways which improves SPo2. SR/ST w/ ST depression. Plan to discharge TBD. Continue with POC.    Major Shift Event/Provider Notifications:  Please see provider notification notes.

## 2021-09-11 NOTE — PLAN OF CARE
/70 (BP Location: Right arm)   Pulse 95   Temp 98  F (36.7  C) (Oral)   Resp 20   Wt 61.6 kg (135 lb 14.4 oz)   LMP 06/01/2006   SpO2 91%   BMI 21.61 kg/m       10:47 AM Respiratory updated: Pt desating trying to eat breakfast. I turned fiO2 up to 90%. Can you come and reasses. Thank you!    2:09 PM Respiratory updated: Pt continuing to desat at  rest and with movement. Turned up fiO2 to 100%. Please come asses pt STAT. Thank you!    Pt A/Ox4. SBA/Ind transferred without assistance. Need assistance if SOB.regular diet. Denies pain. 35L HFNC 95% fiO2. K: 3.8- AM- redraw scheduled. WBC: 12.5. Tele: . Skin CDI. LS dim

## 2021-09-11 NOTE — PLAN OF CARE
Patient vitals stable except the o2 still desats with minimal movement.  Patient denies pain and has minimal needs.  Will cont to monitor

## 2021-09-11 NOTE — PROGRESS NOTES
The HFNC was applied to pt @ 35LPM and 90% for PEEP therapy.     Skin integrity is good    Skin interventions NA    Temp: 98  F (36.7  C) Temp src: Oral BP: 121/69 Pulse: 75   Resp: 24 SpO2: 97 % O2 Device: High Flow Nasal Cannula (HFNC) Oxygen Delivery: 35 LPM     Will continue to monitor, no complications noted at this time.    Mariah Bynum, RT

## 2021-09-11 NOTE — PROGRESS NOTES
[facility]  Hospitalist Progress Note  Gilberto Laureano MD 09/11/2021    Reason for Stay (Diagnosis): Acute hypoxic resp failure due to COVID pneumonia         Assessment and Plan:      Summary of Stay: Henrietta Conner is a 61 year old female admitted on 9/10/2021 with covid pneumonia.      Hx PE assoc wth smoking and OCPs.  Now non-smoker x 10 yrs, but underlying emphysematous changes ion CT.      I met Ms. Conner in the emergency department shortly after she was admitted by Nichelle Alanis MD.  She had required a rapid titration up on her oxygen flow to the point where she was saturating in the low 90s on 11 L/min.  Fortunately, the patient appeared more comfortable than her numbers would belie.  Nevertheless I felt it was important to start her on both remdesivir and tocilizumab in view of the initial CRP greater than 300.  I note the patient also has a history of COPD and that is evident on CT scan.    Unvaccinated  Onset of symptoms: about 8/29/2021  Diagnosis of Covid: 9/7/2021  Toci given on 9/10.   Decadron and Remdesivir initiated on 9/10.    Problem List:   1. Acute hypoxic resp failure due to Covid Pneumonia/ARDS.  After the patient arrived on the medical floor, she was noted to be hypoxic despite being on 15 L by nonrebreather facemask.  2. COPD.  Emphysematous changes are noted on the CT scan but the patient has not required chronic inhaler therapy prior to this time.  3. Hypokalemia.    Plan:  1.  Remains on high flow nasal cannula.  2.  Electrolyte monitoring and replacement as indicated.    DVT Prophylaxis: Enoxaparin (Lovenox) SQ  Code Status: Full Code  Discharge Dispo: TBD  Estimated Disch Date / # of Days until Disch: TBD, likely more than 3 days        Interval History (Subjective):      Pt stabilized overnight and maintained on HFNC: 35 LPM and 90%.  CRP is down to 150, WBC down to 12.5, D-dimer down to 0.59.    Slept relatively well overnight recognizing that she went on high  flow yesterday afternoon.  She tolerated being prone much of the night.  Indicates she still has a cough but generally is feeling a little bit better.  Reports having eaten breakfast today.                  Physical Exam:      Last Vital Signs:  /70 (BP Location: Right arm)   Pulse 79   Temp 98  F (36.7  C) (Oral)   Resp 22   Wt 61.6 kg (135 lb 14.4 oz)   LMP 06/01/2006   SpO2 97%   BMI 21.61 kg/m      No intake/output data recorded.    Constitutional: Awake, alert, cooperative, no apparent distress   Respiratory:  Good air entry bilaterally, posterior fine crackles.  No wheeze.   Cardiovascular: Regular rate and rhythm, normal S1 and S2, and no murmur noted   Abdomen: Normal bowel sounds, soft, non-distended, non-tender   Skin: No rashes, no cyanosis, dry to touch   Neuro: Alert and oriented x3.   Extremities: No edema.   Other(s):        All other systems: Negative          Medications:      All current medications were reviewed with changes reflected in problem list.         Data:      All new lab and imaging data was reviewed.   Labs/Imaging:  Results for orders placed or performed during the hospital encounter of 09/10/21 (from the past 24 hour(s))   Basic metabolic panel   Result Value Ref Range    Sodium 141 133 - 144 mmol/L    Potassium 3.8 3.4 - 5.3 mmol/L    Chloride 109 94 - 109 mmol/L    Carbon Dioxide (CO2) 28 20 - 32 mmol/L    Anion Gap 4 3 - 14 mmol/L    Urea Nitrogen 28 7 - 30 mg/dL    Creatinine 0.71 0.52 - 1.04 mg/dL    Calcium 8.7 8.5 - 10.1 mg/dL    Glucose 146 (H) 70 - 99 mg/dL    GFR Estimate >90 >60 mL/min/1.73m2   CRP inflammation   Result Value Ref Range    CRP Inflammation 150.0 (H) 0.0 - 8.0 mg/L   D dimer quantitative   Result Value Ref Range    D-Dimer Quantitative 0.59 (H) 0.00 - 0.50 ug/mL FEU    Narrative    This D-dimer assay is intended for use in conjunction with a clinical pretest probability assessment model to exclude pulmonary embolism (PE) and deep venous  thrombosis (DVT) in outpatients suspected of PE or DVT. The cut-off value is 0.50 ug/mL FEU.   CBC with platelets   Result Value Ref Range    WBC Count 12.5 (H) 4.0 - 11.0 10e3/uL    RBC Count 3.79 (L) 3.80 - 5.20 10e6/uL    Hemoglobin 11.1 (L) 11.7 - 15.7 g/dL    Hematocrit 35.8 35.0 - 47.0 %    MCV 95 78 - 100 fL    MCH 29.3 26.5 - 33.0 pg    MCHC 31.0 (L) 31.5 - 36.5 g/dL    RDW 12.5 10.0 - 15.0 %    Platelet Count 497 (H) 150 - 450 10e3/uL   Hepatic panel   Result Value Ref Range    Bilirubin Total 0.3 0.2 - 1.3 mg/dL    Bilirubin Direct 0.1 0.0 - 0.2 mg/dL    Protein Total 6.1 (L) 6.8 - 8.8 g/dL    Albumin 2.2 (L) 3.4 - 5.0 g/dL    Alkaline Phosphatase 80 40 - 150 U/L    AST 43 0 - 45 U/L    ALT 59 (H) 0 - 50 U/L

## 2021-09-12 LAB
ANION GAP SERPL CALCULATED.3IONS-SCNC: 3 MMOL/L (ref 3–14)
BASE EXCESS BLDV CALC-SCNC: 4.3 MMOL/L (ref -7.7–1.9)
BUN SERPL-MCNC: 27 MG/DL (ref 7–30)
CALCIUM SERPL-MCNC: 9 MG/DL (ref 8.5–10.1)
CHLORIDE BLD-SCNC: 108 MMOL/L (ref 94–109)
CO2 SERPL-SCNC: 29 MMOL/L (ref 20–32)
CREAT SERPL-MCNC: 0.68 MG/DL (ref 0.52–1.04)
CRP SERPL-MCNC: 77.6 MG/L (ref 0–8)
D DIMER PPP FEU-MCNC: 0.81 UG/ML FEU (ref 0–0.5)
ERYTHROCYTE [DISTWIDTH] IN BLOOD BY AUTOMATED COUNT: 12.2 % (ref 10–15)
GFR SERPL CREATININE-BSD FRML MDRD: >90 ML/MIN/1.73M2
GLUCOSE BLD-MCNC: 124 MG/DL (ref 70–99)
HCO3 BLDV-SCNC: 28 MMOL/L (ref 21–28)
HCT VFR BLD AUTO: 35.9 % (ref 35–47)
HGB BLD-MCNC: 11.5 G/DL (ref 11.7–15.7)
MCH RBC QN AUTO: 29.5 PG (ref 26.5–33)
MCHC RBC AUTO-ENTMCNC: 32 G/DL (ref 31.5–36.5)
MCV RBC AUTO: 92 FL (ref 78–100)
O2/TOTAL GAS SETTING VFR VENT: 30 %
PCO2 BLDV: 40 MM HG (ref 40–50)
PH BLDV: 7.46 [PH] (ref 7.32–7.43)
PLATELET # BLD AUTO: 510 10E3/UL (ref 150–450)
PO2 BLDV: 64 MM HG (ref 25–47)
POTASSIUM BLD-SCNC: 4 MMOL/L (ref 3.4–5.3)
RBC # BLD AUTO: 3.9 10E6/UL (ref 3.8–5.2)
SODIUM SERPL-SCNC: 140 MMOL/L (ref 133–144)
WBC # BLD AUTO: 11.6 10E3/UL (ref 4–11)

## 2021-09-12 PROCEDURE — 999N000157 HC STATISTIC RCP TIME EA 10 MIN

## 2021-09-12 PROCEDURE — 258N000003 HC RX IP 258 OP 636: Performed by: INTERNAL MEDICINE

## 2021-09-12 PROCEDURE — 36415 COLL VENOUS BLD VENIPUNCTURE: CPT | Performed by: INTERNAL MEDICINE

## 2021-09-12 PROCEDURE — 99233 SBSQ HOSP IP/OBS HIGH 50: CPT | Performed by: INTERNAL MEDICINE

## 2021-09-12 PROCEDURE — 86140 C-REACTIVE PROTEIN: CPT | Performed by: INTERNAL MEDICINE

## 2021-09-12 PROCEDURE — 999N000215 HC STATISTIC HFNC ADULT NON-CPAP

## 2021-09-12 PROCEDURE — 250N000011 HC RX IP 250 OP 636: Performed by: INTERNAL MEDICINE

## 2021-09-12 PROCEDURE — 250N000009 HC RX 250: Performed by: INTERNAL MEDICINE

## 2021-09-12 PROCEDURE — 80048 BASIC METABOLIC PNL TOTAL CA: CPT | Performed by: INTERNAL MEDICINE

## 2021-09-12 PROCEDURE — 85379 FIBRIN DEGRADATION QUANT: CPT | Performed by: INTERNAL MEDICINE

## 2021-09-12 PROCEDURE — 250N000012 HC RX MED GY IP 250 OP 636 PS 637: Performed by: INTERNAL MEDICINE

## 2021-09-12 PROCEDURE — 82803 BLOOD GASES ANY COMBINATION: CPT | Performed by: INTERNAL MEDICINE

## 2021-09-12 PROCEDURE — 85048 AUTOMATED LEUKOCYTE COUNT: CPT | Performed by: INTERNAL MEDICINE

## 2021-09-12 PROCEDURE — 120N000001 HC R&B MED SURG/OB

## 2021-09-12 RX ADMIN — SODIUM CHLORIDE 50 ML: 9 INJECTION, SOLUTION INTRAVENOUS at 17:03

## 2021-09-12 RX ADMIN — REMDESIVIR 100 MG: 100 INJECTION, POWDER, LYOPHILIZED, FOR SOLUTION INTRAVENOUS at 17:00

## 2021-09-12 RX ADMIN — DEXAMETHASONE 6 MG: 2 TABLET ORAL at 12:03

## 2021-09-12 RX ADMIN — ENOXAPARIN SODIUM 30 MG: 30 INJECTION SUBCUTANEOUS at 12:03

## 2021-09-12 ASSESSMENT — ACTIVITIES OF DAILY LIVING (ADL)
ADLS_ACUITY_SCORE: 13

## 2021-09-12 NOTE — PROGRESS NOTES
Rt NOTE    Pt on 45L/94% SaO2 90%     Encouraged pt to prone , pt self proning SaO2 increased to 99%    Pt alert , orientated   Skin in tack ,clean and dry with HHFNC    Continue to monitor

## 2021-09-12 NOTE — PROGRESS NOTES
Rt note    Pt remains on HHFNC @ 45LPM/ 94% FiO2  Pt alert , orientated   steve well, pt self proning    Skin in tack, clean and dry    Continue to monitor

## 2021-09-12 NOTE — PROGRESS NOTES
Called patient to schedule a screening mammogram PATIENTPHONEMESSAGE: left message.-     Additional information     Pt remains on HFNC 30 L 90%. Tolerating well. Skin integrity is intact/good. RT will monitor.    Ct Pineda, RT

## 2021-09-12 NOTE — PLAN OF CARE
/67 (BP Location: Right arm)   Pulse 74   Temp 98.2  F (36.8  C)   Resp 22   Wt 61.6 kg (135 lb 14.4 oz)   LMP 06/01/2006   SpO2 95%   BMI 21.61 kg/m       Pt A/Ox4. SBA/Ind transferred without assistance. Need assistance if SOB.regular diet. Denies pain. Special isolation COVID. 45L HFNC 92% fiO2. K: 4.0- AM- redraw scheduled. WBC: 11.6. Skin CDI. LS dim. Lovenox increased to BID.

## 2021-09-12 NOTE — PLAN OF CARE
Pt A/Ox4, up with SBA to the BSC. Pt denies pain, N/V, lightheadedness, and dizziness. Pt desaturates w/ activity into the mid-high 80's%.  Pt self prones.  At resting pt is at 94% on HFNC FiO2 95% on 30 LPM.  Pt is on IV Remdesivir and PO decadron.  Appetite poor this shift. VSS. Plan to discharge TBD. Continue with POC.

## 2021-09-12 NOTE — PROGRESS NOTES
[facility]  Hospitalist Progress Note  Gilberto Laureano MD 09/12/2021    Reason for Stay (Diagnosis): Acute hypoxic resp failure due to COVID pneumonia         Assessment and Plan:      Summary of Stay: Henrietta Conner is a 61 year old female admitted on 9/10/2021 with covid pneumonia.      Hx PE assoc wth smoking and OCPs.  Now non-smoker x 10 yrs, but underlying emphysematous changes ion CT.      I met Ms. Conner in the emergency department shortly after she was admitted by Nichelle Alanis MD.  She had required a rapid titration up on her oxygen flow to the point where she was saturating in the low 90s on 11 L/min.  Fortunately, the patient appeared more comfortable than her numbers would belie.  Nevertheless I felt it was important to start her on both remdesivir and tocilizumab in view of the initial CRP greater than 300.  I note the patient also has a history of COPD and that is evident on CT scan.    Unvaccinated  Onset of symptoms: about 8/29/2021  Diagnosis of Covid: 9/7/2021  Toci given on 9/10.   Decadron and Remdesivir initiated on 9/10.    Problem List:   1. Acute hypoxic resp failure due to Covid Pneumonia/ARDS.  After the patient arrived on the medical floor, she was noted to be hypoxic despite being on 15 L by nonrebreather facemask.  2. COPD.  Emphysematous changes are noted on the CT scan but the patient has not required chronic inhaler therapy prior to this time.  3. Hypokalemia.    Plan:  1.  Remains on high flow nasal cannula.  High flow was increased today to 45 L/min though the patient remains subjectively and objectively comfortable.  2.  Electrolyte monitoring and replacement as indicated.    DVT Prophylaxis: Enoxaparin (Lovenox) SQ  Code Status: Full Code  Discharge Dispo: TBD  Estimated Disch Date / # of Days until Disch: TBD, likely more than 3 days        Interval History (Subjective):      Remained on HFNC at 30 LPM and FiO2 of 90% overnight.  At this time, the patient's  flow has been increased.    Ms. Conner indicates she is still feeling fairly well.  When she gets up to the commode, she is extremely weak and severely dyspneic.      She denies fevers, sweats and chills as well as nausea and vomiting.  She reportedly is eating okay.  She remains positive.                  Physical Exam:      Last Vital Signs:  /69   Pulse 82   Temp 98.9  F (37.2  C) (Oral)   Resp 22   Wt 61.6 kg (135 lb 14.4 oz)   LMP 06/01/2006   SpO2 96%   BMI 21.61 kg/m      I/O last 3 completed shifts:  In: 240 [P.O.:240]  Out: -     Constitutional: Awake, alert, cooperative, no apparent distress   Respiratory:  Good air entry bilaterally though the breath excursion is very short.  Posterior fine crackles.  No wheeze.   Cardiovascular: Regular rate and rhythm, normal S1 and S2, and no murmur noted   Abdomen: Normal bowel sounds, soft, non-distended, non-tender   Skin: No rashes, no cyanosis, dry to touch   Neuro: Alert and oriented x3.   Extremities: No edema.   Other(s):        All other systems: Negative          Medications:      All current medications were reviewed with changes reflected in problem list.         Data:      All new lab and imaging data was reviewed.   Labs/Imaging:  Results for orders placed or performed during the hospital encounter of 09/10/21 (from the past 24 hour(s))   Basic metabolic panel   Result Value Ref Range    Sodium 140 133 - 144 mmol/L    Potassium 4.0 3.4 - 5.3 mmol/L    Chloride 108 94 - 109 mmol/L    Carbon Dioxide (CO2) 29 20 - 32 mmol/L    Anion Gap 3 3 - 14 mmol/L    Urea Nitrogen 27 7 - 30 mg/dL    Creatinine 0.68 0.52 - 1.04 mg/dL    Calcium 9.0 8.5 - 10.1 mg/dL    Glucose 124 (H) 70 - 99 mg/dL    GFR Estimate >90 >60 mL/min/1.73m2   CRP inflammation   Result Value Ref Range    CRP Inflammation 77.6 (H) 0.0 - 8.0 mg/L   D dimer quantitative   Result Value Ref Range    D-Dimer Quantitative 0.81 (H) 0.00 - 0.50 ug/mL FEU    Narrative    This D-dimer assay  is intended for use in conjunction with a clinical pretest probability assessment model to exclude pulmonary embolism (PE) and deep venous thrombosis (DVT) in outpatients suspected of PE or DVT. The cut-off value is 0.50 ug/mL FEU.   CBC with platelets   Result Value Ref Range    WBC Count 11.6 (H) 4.0 - 11.0 10e3/uL    RBC Count 3.90 3.80 - 5.20 10e6/uL    Hemoglobin 11.5 (L) 11.7 - 15.7 g/dL    Hematocrit 35.9 35.0 - 47.0 %    MCV 92 78 - 100 fL    MCH 29.5 26.5 - 33.0 pg    MCHC 32.0 31.5 - 36.5 g/dL    RDW 12.2 10.0 - 15.0 %    Platelet Count 510 (H) 150 - 450 10e3/uL

## 2021-09-12 NOTE — PLAN OF CARE
Patient vitals relatively stable, no requests through NOC, patient denies pain, still on high flow 02 tolerating well.  Will cont to monitor.

## 2021-09-13 LAB
ANION GAP SERPL CALCULATED.3IONS-SCNC: 1 MMOL/L (ref 3–14)
BUN SERPL-MCNC: 26 MG/DL (ref 7–30)
CALCIUM SERPL-MCNC: 8.9 MG/DL (ref 8.5–10.1)
CHLORIDE BLD-SCNC: 107 MMOL/L (ref 94–109)
CO2 SERPL-SCNC: 31 MMOL/L (ref 20–32)
CREAT SERPL-MCNC: 0.6 MG/DL (ref 0.52–1.04)
CRP SERPL-MCNC: 45.3 MG/L (ref 0–8)
D DIMER PPP FEU-MCNC: 1.62 UG/ML FEU (ref 0–0.5)
ERYTHROCYTE [DISTWIDTH] IN BLOOD BY AUTOMATED COUNT: 11.9 % (ref 10–15)
GFR SERPL CREATININE-BSD FRML MDRD: >90 ML/MIN/1.73M2
GLUCOSE BLD-MCNC: 104 MG/DL (ref 70–99)
HCT VFR BLD AUTO: 38.2 % (ref 35–47)
HGB BLD-MCNC: 12.4 G/DL (ref 11.7–15.7)
MCH RBC QN AUTO: 29.8 PG (ref 26.5–33)
MCHC RBC AUTO-ENTMCNC: 32.5 G/DL (ref 31.5–36.5)
MCV RBC AUTO: 92 FL (ref 78–100)
PLATELET # BLD AUTO: 509 10E3/UL (ref 150–450)
POTASSIUM BLD-SCNC: 4 MMOL/L (ref 3.4–5.3)
RBC # BLD AUTO: 4.16 10E6/UL (ref 3.8–5.2)
SODIUM SERPL-SCNC: 139 MMOL/L (ref 133–144)
WBC # BLD AUTO: 8.8 10E3/UL (ref 4–11)

## 2021-09-13 PROCEDURE — 99233 SBSQ HOSP IP/OBS HIGH 50: CPT | Performed by: INTERNAL MEDICINE

## 2021-09-13 PROCEDURE — 258N000003 HC RX IP 258 OP 636: Performed by: INTERNAL MEDICINE

## 2021-09-13 PROCEDURE — 80048 BASIC METABOLIC PNL TOTAL CA: CPT | Performed by: INTERNAL MEDICINE

## 2021-09-13 PROCEDURE — 86140 C-REACTIVE PROTEIN: CPT | Performed by: INTERNAL MEDICINE

## 2021-09-13 PROCEDURE — 250N000011 HC RX IP 250 OP 636: Performed by: INTERNAL MEDICINE

## 2021-09-13 PROCEDURE — 36415 COLL VENOUS BLD VENIPUNCTURE: CPT | Performed by: INTERNAL MEDICINE

## 2021-09-13 PROCEDURE — 120N000001 HC R&B MED SURG/OB

## 2021-09-13 PROCEDURE — 85379 FIBRIN DEGRADATION QUANT: CPT | Performed by: INTERNAL MEDICINE

## 2021-09-13 PROCEDURE — 999N000157 HC STATISTIC RCP TIME EA 10 MIN

## 2021-09-13 PROCEDURE — 250N000009 HC RX 250: Performed by: INTERNAL MEDICINE

## 2021-09-13 PROCEDURE — 999N000215 HC STATISTIC HFNC ADULT NON-CPAP

## 2021-09-13 PROCEDURE — 250N000012 HC RX MED GY IP 250 OP 636 PS 637: Performed by: INTERNAL MEDICINE

## 2021-09-13 PROCEDURE — 85027 COMPLETE CBC AUTOMATED: CPT | Performed by: INTERNAL MEDICINE

## 2021-09-13 RX ORDER — MULTIPLE VITAMINS W/ MINERALS TAB 9MG-400MCG
1 TAB ORAL DAILY
Status: DISCONTINUED | OUTPATIENT
Start: 2021-09-14 | End: 2021-09-17 | Stop reason: HOSPADM

## 2021-09-13 RX ADMIN — ENOXAPARIN SODIUM 30 MG: 30 INJECTION SUBCUTANEOUS at 21:40

## 2021-09-13 RX ADMIN — SODIUM CHLORIDE 50 ML: 9 INJECTION, SOLUTION INTRAVENOUS at 16:40

## 2021-09-13 RX ADMIN — DEXAMETHASONE 6 MG: 2 TABLET ORAL at 13:42

## 2021-09-13 RX ADMIN — ENOXAPARIN SODIUM 30 MG: 30 INJECTION SUBCUTANEOUS at 09:04

## 2021-09-13 RX ADMIN — REMDESIVIR 100 MG: 100 INJECTION, POWDER, LYOPHILIZED, FOR SOLUTION INTRAVENOUS at 16:38

## 2021-09-13 ASSESSMENT — ACTIVITIES OF DAILY LIVING (ADL)
ADLS_ACUITY_SCORE: 13

## 2021-09-13 NOTE — CONSULTS
"CLINICAL NUTRITION SERVICES  -  ASSESSMENT NOTE      MALNUTRITION:  % Weight Loss:  > 2% in 1 week (severe malnutrition)  % Intake:  <75% for > 7 days (non-severe malnutrition)  Subcutaneous Fat Loss: Unable to determine d/t COVID+  Muscle Loss: Unable to determine d/t COVID+  Fluid Retention: None documented    Malnutrition Diagnosis: Non-Severe malnutrition  In Context of:  Acute illness or injury        REASON FOR ASSESSMENT  Henrietta Conner is a 61 year old female seen by Registered Dietitian for Admission Nutrition Risk Screen for positive.    PMH of: PE, HLD.    Admit 2/2: Respiratory failure 2/2 COVID+.    NUTRITION HISTORY  - Information obtained from patient via phone (per direction of department guidelines for COVID+ patients).  - Diet at home: Regular.  - Usual intakes: Meals BID.  - Barriers to PO intakes: Decreased appetite with some N/V/D in the past 2 weeks PTA.  Feels she was consistently eating less than usual during this time and noted wt loss.  Still had sense of taste/smell.  - Use of oral supplements: None PTA.  - Chewing/swallowing issues: Denies.  - Allergies: NKFA.      CURRENT NUTRITION ORDERS  Diet Order:     Regular  Ensure BID between meals    Current Intake/Tolerance:  Limited flowsheet recordings since admit.  Minimal meals ordered (on average, 1x/day).  MD ordered Ensure, which patient reports she likes and is drinking at least 1 daily.  Suspect meeting <75% needs during admit but is slowly improving.  Patient reports her appetite is getting better each day.  She wants to continue receiving the Ensure and likes the flavors that are ordered.      NUTRITION FOCUSED PHYSICAL ASSESSMENT FOR DIAGNOSING MALNUTRITION)  No, COVID+    Obtained from Chart/Interdisciplinary Team:  - No documentation of PI  - Stooling patterns reviewed  - Still requiring HFNC    ANTHROPOMETRICS  Height: 5' 6\"  Weight: 135 lbs 14.4 oz  Body mass index is 21.61 kg/m .  Weight Status:  Normal BMI  Weight " History:  Wt Readings from Last 10 Encounters:   09/10/21 61.6 kg (135 lb 14.4 oz)   12/09/20 68.9 kg (152 lb)   12/19/19 63.5 kg (140 lb)   10/23/18 62.1 kg (137 lb)   08/29/17 64.4 kg (142 lb)   09/02/15 61.2 kg (135 lb)   08/13/14 62.1 kg (137 lb)   04/16/13 61.7 kg (136 lb)   04/11/13 62.1 kg (137 lb)   04/11/13 62.1 kg (137 lb)     - Patient reports ~10# wt loss in 2 weeks PTA.  Notes her UBW prior to the loss was around 145#.  This indicates a ?7% wt loss in 2 weeks.      LABS  Labs reviewed:  Electrolytes  Potassium (mmol/L)   Date Value   09/12/2021 4.0   09/11/2021 3.8   09/10/2021 3.8   12/09/2020 3.8   12/19/2019 3.9   10/23/2018 4.0    Blood Glucose  Glucose (mg/dL)   Date Value   09/12/2021 124 (H)   09/11/2021 146 (H)   09/10/2021 128 (H)   12/09/2020 102 (H)   12/19/2019 98   10/23/2018 93   08/29/2017 97   09/02/2015 92    Inflammatory Markers  CRP Inflammation (mg/L)   Date Value   09/12/2021 77.6 (H)   09/11/2021 150.0 (H)   09/10/2021 305.0 (H)     WBC (10e9/L)   Date Value   12/09/2020 5.1   12/19/2019 4.1   10/23/2018 3.6 (L)     WBC Count (10e3/uL)   Date Value   09/12/2021 11.6 (H)   09/11/2021 12.5 (H)   09/10/2021 14.2 (H)     Albumin (g/dL)   Date Value   09/11/2021 2.2 (L)   09/10/2021 2.6 (L)   12/09/2020 3.8   12/19/2019 3.8   10/23/2018 3.8      Magnesium (mg/dL)   Date Value   09/10/2021 2.6 (H)   09/02/2015 2.1   04/12/2013 2.4 (H)     Sodium (mmol/L)   Date Value   09/12/2021 140   09/11/2021 141   09/10/2021 137   12/09/2020 140   12/19/2019 141   10/23/2018 141    Renal  Urea Nitrogen (mg/dL)   Date Value   09/12/2021 27   09/11/2021 28   09/10/2021 14   12/09/2020 22   12/19/2019 21   10/23/2018 17     Creatinine (mg/dL)   Date Value   09/12/2021 0.68   09/11/2021 0.71   09/10/2021 0.66   12/09/2020 0.71   12/19/2019 0.74   10/23/2018 0.74     Additional  Triglycerides (mg/dL)   Date Value   12/09/2020 82   12/19/2019 87   10/23/2018 53     Ketones Urine (mg/dL)   Date Value    04/11/2013 10 (A)        B/P: 112/63, T: 98.5, P: 71, R: 20      MEDICATIONS  Medications reviewed:    sodium chloride 0.9%  50 mL Intravenous Q24H     remdesivir  100 mg Intravenous Q24H    And     sodium chloride 0.9%  50 mL Intravenous Q24H     dexamethasone  6 mg Oral Daily     enoxaparin ANTICOAGULANT  30 mg Subcutaneous Q12H     sodium chloride (PF)  3 mL Intracatheter Q8H        - MEDICATION INSTRUCTIONS -            ASSESSED NUTRITION NEEDS PER APPROVED PRACTICE GUIDELINES:    Dosing Weight 62 kg   Estimated Energy Needs: 25-30+ Kcal/Kg  Justification: minimum maintenance, COVID+  Estimated Protein Needs: >/=1.2 g pro/Kg  Justification: preservation of lean body mass  Estimated Fluid Needs: per MD      NUTRITION DIAGNOSIS:  Inadequate oral intake related to decreased appetite with previous N/V/D in the setting of COVID+ as evidenced by meeting <75% needs throughout admit and in 2 weeks PTA, severe wt loss during that time reported with coding of malnutrition as a result.    NUTRITION INTERVENTIONS  Recommendations / Nutrition Prescription  Diet per MD.  Small/frequent meals as needed, self-selection of high calorie/high protein as able.     Continue supplements as ordered, confirmed with patient.     Add daily MVI/M.        Implementation  Nutrition education: Provided education on above.  We discussed increased calories/protein d/t COVID+ and respiratory needs.  Encouraged small/frequent meals as needed + ongoing supplement use.  Discussed to have RN page RD as needed if ongoing questions, concerns, or changes in supplements desired.      Multivitamin/Mineral: As above.     Collaboration and Referral of Nutrition care: Discussing POC with team during rounds.      Nutrition Goals  Patient to consume at least 75% of meals or supplements TID.      MONITORING AND EVALUATION:  Progress towards goals will be monitored and evaluated per protocol and Practice Guidelines          Blanca South RDN,  TRAY  Clinical Dietitian  3rd floor/ICU: [phone]  All other floors: [phone]  Weekend/holiday: [phone]

## 2021-09-13 NOTE — PLAN OF CARE
/74 (BP Location: Right arm)   Pulse 59   Temp 97.9  F (36.6  C) (Oral)   Resp 20   Wt 61.6 kg (135 lb 14.4 oz)   LMP 06/01/2006   SpO2 97%   BMI 21.61 kg/m        VSS on HFNC 35L 75% . AO x4. SBA/ ind. LS: dim, BRADEN. Denies pain. Will continue POC.

## 2021-09-13 NOTE — PROGRESS NOTES
Study Title: Assessment of serum zinc levels and outcomes in patients with COVID-19 (Zinc & COVID-19)  : Rohan Fleming MD and Uche Zuniga MD    Enrollment date: 9/13/2021  Subject enrolled under protocol version: 3.0 February 24, 2021    Inclusion Criteria: COVID-19 Positive Group (all answers must be marked yes)   1. Age > 18 years [x] Yes [] No   2. COVID-19 positive by laboratory testing [x] Yes [] No   3. Admitted to [facility], [facility], [facility], [facility], or [facility] [x] Yes [] No   4. Upcoming schedule lab collection within 7 days of admission [x] Yes [] No       Inclusion Criteria: COVID-19 Negative Group (all answers must be marked yes)   1. Age > 18 years [] Yes [] No   2. COVID-19 negative by laboratory testing, and removed from COVID precautions by treating clinical team [] Yes [] No   3. Admitted to [facility], [facility], [facility], [facility], or [facility] [] Yes [] No   4. Upcoming schedule lab collection within 7 days of admission [] Yes [] No   5. Known or suspected source of infection [] Yes [] No       Exclusion Criteria: Both COVID-19 Positive and COVID-19 Negative (all answers must be marked no)   1. Known to be pregnant at time of screening [] Yes [x] No   2. Prisoner status [] Yes [x] No   3. Opted-out of research [] Yes [x] No     All inclusion and exclusion criteria were met and verified by: 9/13/2021 at 12:50 PM   Informed Consent Process Documentation  Study Name:  Assessment of serum zinc levels and outcomes in patients with COVID-19  IRB ID: [id number]    ICF Version Date / IRB Approval Date: 6/11/20   // 6/16/20    Date of Approach/Consent: 9/13/2021    This patient is being enrolled as a member of the COVID-19   group.    The subject was pre-screened and meets all of the inclusion criteria and none of the exclusion criteria at this time.   The subject was told:  -that the study involves research  -the purpose of the research study  -the expected duration of the study and the approximate number of subject sought  -of any benefits to the subject or others that may be expected from the research  -how the confidentiality of records would be maintained  -who to contact if they have questions related to the research study or questions regarding research subjects' rights  -that participation is completely voluntary and that their decision to or not to participate will have no impact on their relationships with the N and the staff    No study procedures were completed prior to the consent being obtained. The use of historical information (lab or assessments) used for the purpose of the study was approved by subject.  The subject was fully aware that we would be reviewing their medical record for the study.  The subject demonstrated an understanding of what the study involved. Specifically, how this study differed from standard of care at our center and what was required of the subject as part of the study.  The subject reviewed the consent form and was given the opportunity to ask questions before signing.  Questions and concerns were answered by the study staff and/or study physician.  This study has been approved for waiver of written informed consent.    The consent required the use of a :    No      Did the subject require a legally-authorized representative (LAR) to sign on their behalf?           No              If yes, please record name of LAR:      Questions to Evaluate Subject Comprehension of Study    Question:                                                                                      Adequate Response?   What is the purpose of this study and how long will it take?                         Yes  Will  you be required to do anything extra during the course of the study?  Yes  What risks are involved in participating in this study?                           Yes

## 2021-09-13 NOTE — PROGRESS NOTES
[facility]  Hospitalist Progress Note  Gilberto Laureano MD 09/13/2021    Reason for Stay (Diagnosis): Acute hypoxic resp failure due to COVID pneumonia         Assessment and Plan:      Summary of Stay: Henrietta Conner is a 61 year old female admitted on 9/10/2021 with covid pneumonia.      Hx PE assoc wth smoking and OCPs.  Now non-smoker x 10 yrs, but underlying emphysematous changes on CT.      I met Ms. Conner in the emergency department shortly after she was admitted by Nichelle Alanis MD.  She had required a rapid titration up on her oxygen flow to the point where she was saturating in the low 90s on 11 L/min.  Fortunately, the patient appeared more comfortable than her numbers would belie.  Nevertheless I felt it was important to start her on both remdesivir and tocilizumab in view of the initial CRP greater than 300.  I note the patient also has a history of COPD and that is evident on CT scan.     Unvaccinated  Onset of symptoms: about 8/29/2021  Diagnosis of Covid: 9/7/2021  Toci given on 9/10.   Decadron and Remdesivir initiated on 9/10.    Problem List:   1. Acute hypoxic resp failure due to Covid Pneumonia/ARDS.  After the patient arrived on the medical floor, she was noted to be hypoxic despite being on 15 L by nonrebreather facemask.  2. COPD.  Emphysematous changes are noted on the CT scan but the patient has not required chronic inhaler therapy prior to this time.  3. Hypokalemia.    Plan:  1.  Remains on high flow nasal cannula.  High flow was reduced overnight to 35 L/min with FiO2 improved from 95 to 70%.  2.  Electrolyte monitoring and replacement as indicated.    DVT Prophylaxis: Enoxaparin (Lovenox) SQ  Code Status: Full Code  Discharge Dispo: TBD  Estimated Disch Date / # of Days until Disch: TBD, likely more than 3 days        Interval History (Subjective):      Able to reduce HF support overnight: HFNC reduced from 45 to 35 LPM and FiO2 reduced to 70% from 95 yesterday.       Generally feeling stable.  I got her up at the bedside and she seems to have tolerated this well. I encouraged her to move more as tolerated.                   Physical Exam:      Last Vital Signs:  /72 (BP Location: Right arm)   Pulse 91   Temp 98.2  F (36.8  C) (Oral)   Resp 18   Wt 61.6 kg (135 lb 14.4 oz)   LMP 06/01/2006   SpO2 96%   BMI 21.61 kg/m      I/O last 3 completed shifts:  In: 600 [P.O.:600]  Out: -     Constitutional: Awake, alert, cooperative, no apparent distress   Respiratory:  Good air entry bilaterally though the breath excursion is very short.  Posterior fine crackles.  No wheeze.   Cardiovascular: Regular rate and rhythm, normal S1 and S2, and no murmur noted   Abdomen: Normal bowel sounds, soft, non-distended, non-tender   Skin: No rashes, no cyanosis, dry to touch   Neuro: Alert and oriented x3.   Extremities: No edema.   Other(s):        All other systems: Negative          Medications:      All current medications were reviewed with changes reflected in problem list.         Data:      All new lab and imaging data was reviewed.   Labs/Imaging:  Results for orders placed or performed during the hospital encounter of 09/10/21 (from the past 24 hour(s))   Blood gas venous   Result Value Ref Range    pH Venous 7.46 (H) 7.32 - 7.43    pCO2 Venous 40 40 - 50 mm Hg    pO2 Venous 64 (H) 25 - 47 mm Hg    Bicarbonate Venous 28 21 - 28 mmol/L    Base Excess/Deficit (+/-) 4.3 (H) -7.7 - 1.9 mmol/L    FIO2 30    Basic metabolic panel   Result Value Ref Range    Sodium 139 133 - 144 mmol/L    Potassium 4.0 3.4 - 5.3 mmol/L    Chloride 107 94 - 109 mmol/L    Carbon Dioxide (CO2) 31 20 - 32 mmol/L    Anion Gap 1 (L) 3 - 14 mmol/L    Urea Nitrogen 26 7 - 30 mg/dL    Creatinine 0.60 0.52 - 1.04 mg/dL    Calcium 8.9 8.5 - 10.1 mg/dL    Glucose 104 (H) 70 - 99 mg/dL    GFR Estimate >90 >60 mL/min/1.73m2   CRP inflammation   Result Value Ref Range    CRP Inflammation 45.3 (H) 0.0 - 8.0 mg/L    D dimer quantitative   Result Value Ref Range    D-Dimer Quantitative 1.62 (H) 0.00 - 0.50 ug/mL FEU    Narrative    This D-dimer assay is intended for use in conjunction with a clinical pretest probability assessment model to exclude pulmonary embolism (PE) and deep venous thrombosis (DVT) in outpatients suspected of PE or DVT. The cut-off value is 0.50 ug/mL FEU.   CBC with platelets   Result Value Ref Range    WBC Count 8.8 4.0 - 11.0 10e3/uL    RBC Count 4.16 3.80 - 5.20 10e6/uL    Hemoglobin 12.4 11.7 - 15.7 g/dL    Hematocrit 38.2 35.0 - 47.0 %    MCV 92 78 - 100 fL    MCH 29.8 26.5 - 33.0 pg    MCHC 32.5 31.5 - 36.5 g/dL    RDW 11.9 10.0 - 15.0 %    Platelet Count 509 (H) 150 - 450 10e3/uL

## 2021-09-13 NOTE — PLAN OF CARE
Pt A/Ox4, up with SBA. Pt denies pain, N/V, lightheadedness, and dizziness. Pt is BRADEN, LS are diminished.  IV saline locked.  Pt is on IV Remdesivir and PO decadron.  VSS on HFNC @ 35 LPM & FiO2 85%, pt self proned.  Plan to discharge TBD. Continue with POC.

## 2021-09-13 NOTE — PROGRESS NOTES
"         [facility]  Respiratory Care Note    The HFNC continues to be applied to the  pt and has been weaned to 30 LPM and 65% for PEEP therapy. Skin looks good and remains intact. Will continue to monitor and assess the pt's current respiratory status and needs.     Vital signs:  Temp: 98.8  F (37.1  C) Temp src: Oral BP: 114/68 Pulse: 85   Resp: 20 SpO2: 91 % O2 Device: High Flow Nasal Cannula (HFNC) Oxygen Delivery: 30 LPM   Weight: 61.6 kg (135 lb 14.4 oz)  Estimated body mass index is 21.61 kg/m  as calculated from the following:    Height as of 20: 1.689 m (5' 6.5\").    Weight as of this encounter: 61.6 kg (135 lb 14.4 oz).    Past Medical History:   Diagnosis Date     Endometriosis of pelvic peritoneum      Hyperlipidemia      Pneumonia due to 2019 novel coronavirus 2021    Only received 1 of 2 vaccination doses at time of diagnosis     Pulmonary embolism        Past Surgical History:   Procedure Laterality Date     C REVISN JAW MUSCLE/BONE,EXTRAORAL  1976     HYSTERECTOMY, ELIAS  2006    ELIAS-BSO     PELVIS LAPAROSCOPY,DX  ,    Laparoscopyx2 for endometriosis     REPAIR BLADDER  1967       Family History   Problem Relation Age of Onset     Cerebrovascular Disease Mother         in early 70's     Arthritis Mother      Hypertension Mother      Diabetes Mother      Lipids Mother      Diabetes Father      Hypertension Father      Arthritis Father      Obesity Brother         x2     C.A.D. Maternal Grandfather          80's       Social History     Tobacco Use     Smoking status: Former Smoker     Packs/day: 1.00     Years: 30.00     Pack years: 30.00     Types: Cigarettes     Smokeless tobacco: Never Used   Substance Use Topics     Alcohol use: No     Jaxon Burgos RT  [facility]  2021        "

## 2021-09-13 NOTE — PLAN OF CARE
A&OX4. Up with SBA. VSS, On HFNC @ 35 LPM & FiO2 70%, sating mid 90s. Denies any pain and SOB. Lungs sounds diminished.  FREEDOM PATEL. Pt is on IV Remdesivir and PO decadron.  Will continue to monitor.

## 2021-09-13 NOTE — PROGRESS NOTES
Pt remains on HFNC. Current settings 35 L 75%, sats mid 90's. Skin integrity is good. RT will continue to monitor.    Ct Pineda, RT

## 2021-09-14 LAB
ALBUMIN SERPL-MCNC: 2.4 G/DL (ref 3.4–5)
ALP SERPL-CCNC: 67 U/L (ref 40–150)
ALT SERPL W P-5'-P-CCNC: 53 U/L (ref 0–50)
ANION GAP SERPL CALCULATED.3IONS-SCNC: 4 MMOL/L (ref 3–14)
AST SERPL W P-5'-P-CCNC: 22 U/L (ref 0–45)
BILIRUB DIRECT SERPL-MCNC: <0.1 MG/DL (ref 0–0.2)
BILIRUB SERPL-MCNC: 0.4 MG/DL (ref 0.2–1.3)
BUN SERPL-MCNC: 25 MG/DL (ref 7–30)
CALCIUM SERPL-MCNC: 8.5 MG/DL (ref 8.5–10.1)
CHLORIDE BLD-SCNC: 105 MMOL/L (ref 94–109)
CO2 SERPL-SCNC: 30 MMOL/L (ref 20–32)
CREAT SERPL-MCNC: 0.63 MG/DL (ref 0.52–1.04)
CRP SERPL-MCNC: 27 MG/L (ref 0–8)
D DIMER PPP FEU-MCNC: 1.92 UG/ML FEU (ref 0–0.5)
ERYTHROCYTE [DISTWIDTH] IN BLOOD BY AUTOMATED COUNT: 11.9 % (ref 10–15)
GFR SERPL CREATININE-BSD FRML MDRD: >90 ML/MIN/1.73M2
GLUCOSE BLD-MCNC: 98 MG/DL (ref 70–99)
HCT VFR BLD AUTO: 40.5 % (ref 35–47)
HGB BLD-MCNC: 13.2 G/DL (ref 11.7–15.7)
MCH RBC QN AUTO: 30.1 PG (ref 26.5–33)
MCHC RBC AUTO-ENTMCNC: 32.6 G/DL (ref 31.5–36.5)
MCV RBC AUTO: 92 FL (ref 78–100)
PLATELET # BLD AUTO: 508 10E3/UL (ref 150–450)
POTASSIUM BLD-SCNC: 4 MMOL/L (ref 3.4–5.3)
PROT SERPL-MCNC: 5.9 G/DL (ref 6.8–8.8)
RBC # BLD AUTO: 4.39 10E6/UL (ref 3.8–5.2)
SODIUM SERPL-SCNC: 139 MMOL/L (ref 133–144)
WBC # BLD AUTO: 7.6 10E3/UL (ref 4–11)

## 2021-09-14 PROCEDURE — 80048 BASIC METABOLIC PNL TOTAL CA: CPT | Performed by: INTERNAL MEDICINE

## 2021-09-14 PROCEDURE — 999N000215 HC STATISTIC HFNC ADULT NON-CPAP

## 2021-09-14 PROCEDURE — 85379 FIBRIN DEGRADATION QUANT: CPT | Performed by: INTERNAL MEDICINE

## 2021-09-14 PROCEDURE — 86140 C-REACTIVE PROTEIN: CPT | Performed by: INTERNAL MEDICINE

## 2021-09-14 PROCEDURE — 36415 COLL VENOUS BLD VENIPUNCTURE: CPT | Performed by: INTERNAL MEDICINE

## 2021-09-14 PROCEDURE — 250N000009 HC RX 250: Performed by: INTERNAL MEDICINE

## 2021-09-14 PROCEDURE — 85014 HEMATOCRIT: CPT | Performed by: INTERNAL MEDICINE

## 2021-09-14 PROCEDURE — 250N000012 HC RX MED GY IP 250 OP 636 PS 637: Performed by: INTERNAL MEDICINE

## 2021-09-14 PROCEDURE — 99233 SBSQ HOSP IP/OBS HIGH 50: CPT | Performed by: INTERNAL MEDICINE

## 2021-09-14 PROCEDURE — 250N000011 HC RX IP 250 OP 636: Performed by: INTERNAL MEDICINE

## 2021-09-14 PROCEDURE — 999N000157 HC STATISTIC RCP TIME EA 10 MIN

## 2021-09-14 PROCEDURE — 82040 ASSAY OF SERUM ALBUMIN: CPT | Performed by: INTERNAL MEDICINE

## 2021-09-14 PROCEDURE — 84630 ASSAY OF ZINC: CPT | Performed by: INTERNAL MEDICINE

## 2021-09-14 PROCEDURE — 120N000001 HC R&B MED SURG/OB

## 2021-09-14 PROCEDURE — 250N000013 HC RX MED GY IP 250 OP 250 PS 637: Performed by: INTERNAL MEDICINE

## 2021-09-14 PROCEDURE — 258N000003 HC RX IP 258 OP 636: Performed by: INTERNAL MEDICINE

## 2021-09-14 RX ADMIN — MULTIPLE VITAMINS W/ MINERALS TAB 1 TABLET: TAB at 10:25

## 2021-09-14 RX ADMIN — ENOXAPARIN SODIUM 30 MG: 30 INJECTION SUBCUTANEOUS at 10:25

## 2021-09-14 RX ADMIN — SODIUM CHLORIDE 50 ML: 9 INJECTION, SOLUTION INTRAVENOUS at 18:15

## 2021-09-14 RX ADMIN — ENOXAPARIN SODIUM 30 MG: 30 INJECTION SUBCUTANEOUS at 21:13

## 2021-09-14 RX ADMIN — REMDESIVIR 100 MG: 100 INJECTION, POWDER, LYOPHILIZED, FOR SOLUTION INTRAVENOUS at 17:07

## 2021-09-14 RX ADMIN — DEXAMETHASONE 6 MG: 2 TABLET ORAL at 14:05

## 2021-09-14 ASSESSMENT — ACTIVITIES OF DAILY LIVING (ADL)
ADLS_ACUITY_SCORE: 13
ADLS_ACUITY_SCORE: 16
ADLS_ACUITY_SCORE: 13
ADLS_ACUITY_SCORE: 13

## 2021-09-14 NOTE — PLAN OF CARE
Pt A/Ox4, up Independently in room.  Pt denies pain, N/V, SOB, lightheadedness, and dizziness. Pt is dyspneic on exertion SPO2 at 87-89% while standing at bedside. LS are diminished.  Pt is on IV Remdesivir and PO decadron.  Encouraged IS, nutritional intake, and activity.  Pt IS level is @ 1250 mL, & tolerating it well, pt appetite is good, and pt increased activity at bedside as tolerated.  VSS, weaned HFNC: FiO2 45% & 20 LPM.  Plan to discharge TBD. Continue with POC.

## 2021-09-14 NOTE — PLAN OF CARE
A&OX4. LS diminished. VSS : .  HFNC 45% fi02 @ 30Lpm was changed to Oximyzer Cannular sating at 96%. It tolerating it well.  SOB on exertion. Denied pain nor discomfort. Remdesivir Infused per order. Educated on the use of incentive spirometer. Regular diet. VSS every 4 hours. Continue monitoring.

## 2021-09-14 NOTE — PROGRESS NOTES
Pt remains on HFNC. Current settings 30 L 55%, sats mid 90's. Skin integrity is good. RT will continue to monitor.     Ct Pineda, RT

## 2021-09-14 NOTE — PROGRESS NOTES
[facility]  Hospitalist Progress Note  Gilberto Laureano MD 09/14/2021    Reason for Stay (Diagnosis): Acute hypoxic resp failure due to COVID pneumonia         Assessment and Plan:      Summary of Stay: Henrietta Conner is a 61 year old female admitted on 9/10/2021 with covid pneumonia.      Hx PE assoc wth smoking and OCPs.  Now non-smoker x 10 yrs, but underlying emphysematous changes on CT.      I met Ms. Conner in the emergency department shortly after she was admitted by Nichelle Alanis MD.  She had required a rapid titration up on her oxygen flow to the point where she was saturating in the low 90s on 11 L/min.  Fortunately, the patient appeared more comfortable than her numbers suggested.  Nevertheless I felt it was important to start her on both remdesivir and tocilizumab in view of the initial CRP greater than 300.  I note the patient also has a history of COPD and that is evident on CT scan.    Overall, despite a very high initial CRP, underlying COPD and bilateral infiltrates, patient has done remarkably well.  She has fairly consistently weaned down from the initial high flow that she required.  This time, she is at 20 L/min and FiO2 45%.  She is able to maintain this, she will be able to switch to nasal cannula soon.     Unvaccinated  Onset of symptoms: about 8/29/2021  Diagnosis of Covid: 9/7/2021  Toci given on 9/10.   Decadron and Remdesivir initiated on 9/10.  Completes 5 days of Remdesivir today.    Problem List:   1. Acute hypoxic resp failure due to Covid Pneumonia/ARDS.    2. COPD.  Emphysematous changes are noted on the CT scan but the patient has not required chronic inhaler therapy prior to this time.  3. Hypokalemia.    Plan:  1.  Remains on high flow nasal cannula.  High flow was reduced overnight to 35 L/min with FiO2 improved from 95 to 70%.  2.  Electrolyte monitoring and replacement as indicated.  3.  Will need follow-up CT in approximately 3 months to reevaluate nodular  areas identified on CT scan.    DVT Prophylaxis: Enoxaparin (Lovenox) SQ  Code Status: Full Code  Discharge Dispo: TBD  Estimated Disch Date / # of Days until Disch: TBD, likely more than 3 days        Interval History (Subjective):      Able to wean HF support overnight: HFNC reduced to 30 LPM and FiO2 55%.    Remains very positive.  Stood by the bedside with me today and was able to march in place.  Appears very comfortable and appropriate.  Ports she is eating well.  Denies nausea and vomiting.  No dizziness when she gets up but becomes obviously more dyspneic with activity.                    Physical Exam:      Last Vital Signs:  /65 (BP Location: Right arm)   Pulse 108   Temp 98.1  F (36.7  C) (Oral)   Resp 20   Wt 61.6 kg (135 lb 14.4 oz)   LMP 06/01/2006   SpO2 92%   BMI 21.61 kg/m      I/O last 3 completed shifts:  In: 240 [P.O.:240]  Out: -     Constitutional: Awake, alert, cooperative, no apparent distress   Respiratory:  Good air entry bilaterally though the breath excursion is very short.  Posterior fine crackles.  No wheeze.   Cardiovascular: Regular rate and rhythm, normal S1 and S2, and no murmur noted   Abdomen: Normal bowel sounds, soft, non-distended, non-tender   Skin: No rashes, no cyanosis, dry to touch   Neuro: Alert and oriented x3.   Extremities: No edema.   Other(s):        All other systems: Negative          Medications:      All current medications were reviewed with changes reflected in problem list.         Data:      All new lab and imaging data was reviewed.   Labs/Imaging:  Results for orders placed or performed during the hospital encounter of 09/10/21 (from the past 24 hour(s))   Basic metabolic panel   Result Value Ref Range    Sodium 139 133 - 144 mmol/L    Potassium 4.0 3.4 - 5.3 mmol/L    Chloride 105 94 - 109 mmol/L    Carbon Dioxide (CO2) 30 20 - 32 mmol/L    Anion Gap 4 3 - 14 mmol/L    Urea Nitrogen 25 7 - 30 mg/dL    Creatinine 0.63 0.52 - 1.04 mg/dL     Calcium 8.5 8.5 - 10.1 mg/dL    Glucose 98 70 - 99 mg/dL    GFR Estimate >90 >60 mL/min/1.73m2   CRP inflammation   Result Value Ref Range    CRP Inflammation 27.0 (H) 0.0 - 8.0 mg/L   D dimer quantitative   Result Value Ref Range    D-Dimer Quantitative 1.92 (H) 0.00 - 0.50 ug/mL FEU    Narrative    This D-dimer assay is intended for use in conjunction with a clinical pretest probability assessment model to exclude pulmonary embolism (PE) and deep venous thrombosis (DVT) in outpatients suspected of PE or DVT. The cut-off value is 0.50 ug/mL FEU.   CBC with platelets   Result Value Ref Range    WBC Count 7.6 4.0 - 11.0 10e3/uL    RBC Count 4.39 3.80 - 5.20 10e6/uL    Hemoglobin 13.2 11.7 - 15.7 g/dL    Hematocrit 40.5 35.0 - 47.0 %    MCV 92 78 - 100 fL    MCH 30.1 26.5 - 33.0 pg    MCHC 32.6 31.5 - 36.5 g/dL    RDW 11.9 10.0 - 15.0 %    Platelet Count 508 (H) 150 - 450 10e3/uL   Hepatic panel   Result Value Ref Range    Bilirubin Total 0.4 0.2 - 1.3 mg/dL    Bilirubin Direct <0.1 0.0 - 0.2 mg/dL    Protein Total 5.9 (L) 6.8 - 8.8 g/dL    Albumin 2.4 (L) 3.4 - 5.0 g/dL    Alkaline Phosphatase 67 40 - 150 U/L    AST 22 0 - 45 U/L    ALT 53 (H) 0 - 50 U/L

## 2021-09-14 NOTE — PLAN OF CARE
Pt. A&Ox4, up to BSC, Lung sounds diminished, continues on HFNC 55% FiO2, 30LPM, infrequent nonproductive cough. I.S. up to 1000. Pt. Denies any other needs at this time. Will continue with POC.

## 2021-09-14 NOTE — PLAN OF CARE
A/Ox4.  VSS on 30L HFNC at 60%.  Denies pain. LS dim w/ LLL fine crackles, denies SOB. O2 dropped to 84% when up to BSC but recovers quick.  Infrequent cough.  IS at bedside, self-administers.  No tele, denies CP.  R PIV SL.  Reg diet good appetite.  Up ad valarie.  Covid precautions maintained.  Discharge tbd. Continue POC.

## 2021-09-15 LAB — ZINC SERPL-MCNC: 87 UG/DL

## 2021-09-15 PROCEDURE — 120N000001 HC R&B MED SURG/OB

## 2021-09-15 PROCEDURE — 250N000013 HC RX MED GY IP 250 OP 250 PS 637: Performed by: INTERNAL MEDICINE

## 2021-09-15 PROCEDURE — 250N000011 HC RX IP 250 OP 636: Performed by: INTERNAL MEDICINE

## 2021-09-15 PROCEDURE — 250N000012 HC RX MED GY IP 250 OP 636 PS 637: Performed by: INTERNAL MEDICINE

## 2021-09-15 PROCEDURE — 999N000157 HC STATISTIC RCP TIME EA 10 MIN

## 2021-09-15 PROCEDURE — 99233 SBSQ HOSP IP/OBS HIGH 50: CPT | Performed by: INTERNAL MEDICINE

## 2021-09-15 RX ADMIN — ENOXAPARIN SODIUM 30 MG: 30 INJECTION SUBCUTANEOUS at 20:21

## 2021-09-15 RX ADMIN — DEXAMETHASONE 6 MG: 2 TABLET ORAL at 12:20

## 2021-09-15 RX ADMIN — ENOXAPARIN SODIUM 30 MG: 30 INJECTION SUBCUTANEOUS at 08:08

## 2021-09-15 RX ADMIN — MULTIPLE VITAMINS W/ MINERALS TAB 1 TABLET: TAB at 08:08

## 2021-09-15 ASSESSMENT — ACTIVITIES OF DAILY LIVING (ADL)
ADLS_ACUITY_SCORE: 16
ADLS_ACUITY_SCORE: 13

## 2021-09-15 NOTE — PLAN OF CARE
To Do:  End of Shift Summary  For vital signs and complete assessments, please see documentation flowsheets.     Pertinent assessments: A&Ox4. BLL crackles noted, BUL diminished. Saturations maintained w/ supplemental oxygen. Reports SOB w/activity. Up independently in room.   Major Shift Events: BP's low (reports she normally runs low).   Treatment Plan: Covid-19 cares  & Supportive cares.   Bedside Nurse: Amada Prado RN

## 2021-09-15 NOTE — PLAN OF CARE
A&Ox4. VSS. Oxymizer cannula 6 L. BRADEN. Up independently. PO decadron. Remdesivir completed 9/14. Possible discharge 2+ days.

## 2021-09-15 NOTE — PLAN OF CARE
A&OX4. Ls diminished. Oxygen 96% on 4Lper oxymizer. Independent in room. Oxygen desats with activities. Denied pain nor discomfort. Patient continues to improve with oxygen.Completed remdesivir. Continue with daily decadron and Lovenox subQ.  Continue monitoring.

## 2021-09-15 NOTE — PROGRESS NOTES
[facility]  Hospitalist Progress Note  Santhosh Aponte MD 09/15/2021    Reason for Stay (Diagnosis): Acute hypoxic resp failure due to COVID pneumonia         Assessment and Plan:      Summary of Stay: Henrietta Conner is a 61 year old female admitted on 9/10/2021 with covid pneumonia.  She has a medical history pertinent for PE assoc wth smoking and OCPs.  Now non-smoker x 10 yrs, but underlying emphysematous changes on CT.      She was admitted for acute hypoxemic respiratory failure and started on steroids, remdesivir and given a dose of tocilizumab in the setting of very high inflammatory markers.  She did require high flow nasal cannula and now over the past few days has been able to wean down gradually.       Unvaccinated  Onset of symptoms: about 8/29/2021  Diagnosis of Covid: 9/7/2021  Toci given on 9/10.   Decadron and Remdesivir initiated on 9/10.      Problem List:   1. Acute hypoxic resp failure due to Covid Pneumonia/ARDS.  Now improving, gradually weaning O2.  --Completed remdesivir as above  --Continue Decadron, day 6 of 10.  --Event tocilizumab    2. COPD.  Emphysematous changes are noted on the CT scan but the patient has not required chronic inhaler therapy prior to this time.    3. Hypokalemia.    4.  Nodular changes seen on CT chest:  Will need follow-up CT in approximately 3 months to reevaluate nodular areas identified on CT scan.    DVT Prophylaxis: Enoxaparin (Lovenox) SQ  Code Status: Full Code  Discharge Dispo: TBD  Estimated Disch Date / # of Days until Disch: TBD, likely 2-3 days.        Interval History (Subjective):      Off high flow nasal cannula today, now on 6 L by Oxymizer.  Reduce to 5 while I was in the room.  No chest pain, feels okay otherwise, increasing activity  Adding flutter valve                    Physical Exam:      Last Vital Signs:  /64 (BP Location: Right arm)   Pulse 76   Temp 97.6  F (36.4  C) (Oral)   Resp 18   Wt 61.6 kg (135  lb 14.4 oz)   LMP 06/01/2006   SpO2 98%   BMI 21.61 kg/m      I/O last 3 completed shifts:  In: 480 [P.O.:480]  Out: -     Constitutional: Awake, alert, cooperative, no apparent distress   Respiratory:  Overall good air movement, no wheezing noted.  Bilateral rales which are subtle.  No work of breathing.   Cardiovascular: Regular rate and rhythm, normal S1 and S2, and no murmur noted   Abdomen: Normal bowel sounds, soft, non-distended, non-tender   Skin: No rashes, no cyanosis, dry to touch   Neuro: Alert and oriented x3.   Extremities: No edema.   Other(s):        All other systems: Negative          Medications:      All current medications were reviewed with changes reflected in problem list.         Data:      All new lab and imaging data was reviewed.   Labs/Imaging:  No results found for this or any previous visit (from the past 24 hour(s)).

## 2021-09-16 LAB — POTASSIUM BLD-SCNC: 3.9 MMOL/L (ref 3.4–5.3)

## 2021-09-16 PROCEDURE — 250N000011 HC RX IP 250 OP 636: Performed by: INTERNAL MEDICINE

## 2021-09-16 PROCEDURE — 120N000001 HC R&B MED SURG/OB

## 2021-09-16 PROCEDURE — 250N000012 HC RX MED GY IP 250 OP 636 PS 637: Performed by: INTERNAL MEDICINE

## 2021-09-16 PROCEDURE — 99233 SBSQ HOSP IP/OBS HIGH 50: CPT | Performed by: INTERNAL MEDICINE

## 2021-09-16 PROCEDURE — 84132 ASSAY OF SERUM POTASSIUM: CPT | Performed by: INTERNAL MEDICINE

## 2021-09-16 PROCEDURE — 250N000013 HC RX MED GY IP 250 OP 250 PS 637: Performed by: INTERNAL MEDICINE

## 2021-09-16 PROCEDURE — 36415 COLL VENOUS BLD VENIPUNCTURE: CPT | Performed by: INTERNAL MEDICINE

## 2021-09-16 RX ADMIN — ENOXAPARIN SODIUM 30 MG: 30 INJECTION SUBCUTANEOUS at 20:22

## 2021-09-16 RX ADMIN — ENOXAPARIN SODIUM 30 MG: 30 INJECTION SUBCUTANEOUS at 08:54

## 2021-09-16 RX ADMIN — MULTIPLE VITAMINS W/ MINERALS TAB 1 TABLET: TAB at 08:54

## 2021-09-16 RX ADMIN — DEXAMETHASONE 6 MG: 2 TABLET ORAL at 13:12

## 2021-09-16 ASSESSMENT — ACTIVITIES OF DAILY LIVING (ADL)
ADLS_ACUITY_SCORE: 8
ADLS_ACUITY_SCORE: 13
ADLS_ACUITY_SCORE: 10
ADLS_ACUITY_SCORE: 8

## 2021-09-16 NOTE — PLAN OF CARE
Patient is A/Ox4 and is independent in room. Patient is on regular diet. No complaints of pain during shift. VSS. /54 (BP Location: Right arm)   Pulse 66   Temp 97.7  F (36.5  C) (Oral)   Resp 20   Wt 61.6 kg (135 lb 14.4 oz)   LMP 06/01/2006   SpO2 97%   BMI 21.61 kg/m   Patient on 4L Oxymizer NC; attempted to wean down to 3L during shift but pt stated they prefer to wait until morning to wean. Patient has completed remdesivir and is still on decadron. Plan to continue to wean O2 and discharge is possible in 2-3 days per MD. Will continue to monitor.

## 2021-09-16 NOTE — PROGRESS NOTES
CLINICAL NUTRITION SERVICES - REASSESSMENT NOTE    Recommendations Ordered by Registered Dietitian (RD):   Continue oral nutrition supplements between meals   Malnutrition:  % Weight Loss:  > 2% in 1 week (severe malnutrition)  % Intake:  <75% for > 7 days (non-severe malnutrition)  Subcutaneous Fat Loss: Mild to moderate, as outlined below  Muscle Loss: Moderate, as outlined below  Fluid Retention:  None noted    Malnutrition Diagnosis: Severe malnutrition  In Context of:  Acute illness or injury     EVALUATION OF PROGRESS TOWARD GOALS/NEW FINDINGS   Progress towards goals will be monitored and evaluated per protocol and Practice Guidelines    Diet order: Regular   Supplements: Ensure 10-2  Per flow sheet review, % intake for documented meals since 9/13    Diet history:  At home, eating poorly x 1 week consistent with COVID19 infection. Was drinking Ensure due to poor intake. Prior to this, followed a strict low carb diet (<75 g CHO per day). Weight typically 145#    Nutrition focused physical exam:  - Muscle loss: moderate in BLE (calves, patellar, upper thigh), upper body (clavicle, acromion) and temporal region  - Fat loss: mild to moderate depletion in orbital region, lower and upper arms, thoracic region      BM: 9/16    Fluid: no edema documented    Weight: 61 kg  - no update since 9/10    Labs, meds: reviewed    Generalized weakness     Keith nutrition score: 3; total score: 22    I/O last 3 completed shifts:    In: 563 [P.O.:560; I.V.:3]    Out: - :  ASSESSED NUTRITION NEEDS (PER APPROVED PRACTICE GUIDELINES; DW: 62 kg):  Estimated Energy Needs: 25-30+ Kcal/Kg  Justification: minimum maintenance, COVID+  Estimated Protein Needs: >/=1.2 g pro/Kg  Justification: preservation of lean body mass  Estimated Fluid Needs: per MD    Previous Goals:   Patient to consume at least 75% of meals or supplements TID.  Evaluation: Met    Previous Nutrition Diagnosis:   Inadequate oral intake related to decreased  appetite with previous N/V/D in the setting of COVID+ as evidenced by meeting <75% needs throughout admit and in 2 weeks PTA, severe wt loss during that time reported with coding of malnutrition as a result.  Evaluation: Improving, updated below    CURRENT NUTRITION DIAGNOSIS  Malnutrition related to poor intake, active COVID 19 infection as evidenced by meeting <75% needs throughout admit and in 2 weeks PTA, severe wt loss with fat and muscle wasting     INTERVENTIONS  Recommendations / Nutrition Prescription  Continue regular diet as ordered + oral nutrition supplements to increase protein and calorie intake    Implementation  Medical food supplement: Ensure 10-2  Nutrition education: reviewed oral nutrition supplements options, increased calories and protein through nutrient dense options, small frequent meals, hypercatabolic nature of COVID and goal of strength + weight preservation  Collaboration and Referral of care: Discussed patient during interdisciplinary care rounds this morning    Goals  Patient to consume >/=75% of meals TID and 1-2 high protein supplements per day    MONITORING AND EVALUATION:  Progress towards goals will be monitored and evaluated per protocol and Practice Guidelines      Alyssa Park, MS, RDN-AP, LD, CNSC  Pager - 3rd floor/ICU: [phone]  Pager - All other floors: [phone]  Pager - Weekend/holiday: [phone]  Office: [phone]

## 2021-09-16 NOTE — PROGRESS NOTES
[facility]  Hospitalist Progress Note  Santhosh Aponte MD 09/16/2021    Reason for Stay (Diagnosis): Acute hypoxic resp failure due to COVID pneumonia         Assessment and Plan:      Summary of Stay: Henrietta Conner is a 61 year old female admitted on 9/10/2021 with covid pneumonia.  She has a medical history pertinent for PE assoc wth smoking and OCPs.  Now non-smoker x 10 yrs, but underlying emphysematous changes on CT.      She was admitted for acute hypoxemic respiratory failure and started on steroids, remdesivir and given a dose of tocilizumab in the setting of very high inflammatory markers.  She did require high flow nasal cannula and now over the past few days has been able to wean down gradually.       Unvaccinated  Onset of symptoms: about 8/29/2021  Diagnosis of Covid: 9/7/2021  Toci given on 9/10.   Decadron and Remdesivir initiated on 9/10.      Problem List:   1. Acute hypoxic resp failure due to Covid Pneumonia/ARDS.  Now improving, gradually weaning O2.  --Completed remdesivir as above  --Continue Decadron, day 7 of 10.  --Event tocilizumab    2. COPD.  Emphysematous changes are noted on the CT scan but the patient has not required chronic inhaler therapy prior to this time.    3. Hypokalemia.    4.  Nodular changes seen on CT chest:  Will need follow-up CT in approximately 3 months to reevaluate nodular areas identified on CT scan.    5.  Non-severe protein calorie malnutrition    DVT Prophylaxis: Enoxaparin (Lovenox) SQ  Code Status: Full Code  Discharge Dispo: TBD  Estimated Disch Date / # of Days until Disch: TBD, likely 1-2 days.        Interval History (Subjective):      Weaned from 6 L by Oxymizer to 3L, weaning further.  Feeling fairly well overall  Day 7 decadron                    Physical Exam:      Last Vital Signs:  /54 (BP Location: Right arm)   Pulse 66   Temp 97.7  F (36.5  C) (Oral)   Resp 20   Wt 61.6 kg (135 lb 14.4 oz)   LMP 06/01/2006    SpO2 97%   BMI 21.61 kg/m      I/O last 3 completed shifts:  In: 563 [P.O.:560; I.V.:3]  Out: -     Constitutional: Awake, alert, cooperative, no apparent distress   Respiratory:  Overall good air movement, no wheezing noted.  Bilateral rales which are subtle.  No work of breathing.   Cardiovascular: Regular rate and rhythm, normal S1 and S2, and no murmur noted   Abdomen: Normal bowel sounds, soft, non-distended, non-tender   Skin: No rashes, no cyanosis, dry to touch   Neuro: Alert and oriented x3.   Extremities: No edema.   Other(s):        All other systems: Negative          Medications:      All current medications were reviewed with changes reflected in problem list.         Data:      All new lab and imaging data was reviewed.   Labs/Imaging:  Results for orders placed or performed during the hospital encounter of 09/10/21 (from the past 24 hour(s))   Potassium   Result Value Ref Range    Potassium 3.9 3.4 - 5.3 mmol/L

## 2021-09-16 NOTE — PLAN OF CARE
A&OX4. Ls diminished, but clear with some inspirtory wheezing to to RUL. Educated patient use I&S and Acapella. Oxygen is being titrated down and now on room air.  Saturation is 95% on RA. Independent in room. Denied any distress, sob nor pain. Continue monitoring.

## 2021-09-17 VITALS
TEMPERATURE: 97 F | WEIGHT: 135.9 LBS | DIASTOLIC BLOOD PRESSURE: 72 MMHG | SYSTOLIC BLOOD PRESSURE: 105 MMHG | HEART RATE: 80 BPM | RESPIRATION RATE: 20 BRPM | BODY MASS INDEX: 21.61 KG/M2 | OXYGEN SATURATION: 95 %

## 2021-09-17 LAB — POTASSIUM BLD-SCNC: 3.9 MMOL/L (ref 3.4–5.3)

## 2021-09-17 PROCEDURE — 99239 HOSP IP/OBS DSCHRG MGMT >30: CPT | Performed by: INTERNAL MEDICINE

## 2021-09-17 PROCEDURE — 250N000011 HC RX IP 250 OP 636: Performed by: INTERNAL MEDICINE

## 2021-09-17 PROCEDURE — 84132 ASSAY OF SERUM POTASSIUM: CPT | Performed by: INTERNAL MEDICINE

## 2021-09-17 PROCEDURE — 36415 COLL VENOUS BLD VENIPUNCTURE: CPT | Performed by: INTERNAL MEDICINE

## 2021-09-17 PROCEDURE — 250N000013 HC RX MED GY IP 250 OP 250 PS 637: Performed by: INTERNAL MEDICINE

## 2021-09-17 RX ORDER — DEXAMETHASONE 6 MG/1
6 TABLET ORAL DAILY
Qty: 2 TABLET | Refills: 0 | Status: SHIPPED | OUTPATIENT
Start: 2021-09-18 | End: 2021-11-16

## 2021-09-17 RX ADMIN — ENOXAPARIN SODIUM 30 MG: 30 INJECTION SUBCUTANEOUS at 08:09

## 2021-09-17 RX ADMIN — MULTIPLE VITAMINS W/ MINERALS TAB 1 TABLET: TAB at 08:09

## 2021-09-17 ASSESSMENT — ACTIVITIES OF DAILY LIVING (ADL)
ADLS_ACUITY_SCORE: 8

## 2021-09-17 NOTE — PROGRESS NOTES
Discharge instructions provided to the pt. Review of RX and inhaler w/spacer complete. Side effects reviewed.Covid discharge instructions provided/pt stated full understanding of all instructions. To discharge to home via staff w/c to spouse.

## 2021-09-17 NOTE — PLAN OF CARE
A&Ox4, VSS, O2 sats 97% on RA, ind, denies pain. Remdesivir complete, continuing to receive PO decadron and subcutaneous lovenox.

## 2021-09-17 NOTE — DISCHARGE INSTRUCTIONS
"Discharge Instructions for COVID-19 Patients  You have--or may have--COVID-19. Please follow the instructions listed below.   If you have a weakened immune system, discuss with your doctor any other actions you need to take.  How can I protect others?  If you have symptoms (fever, cough, body aches or trouble breathing):    Stay home and away from others (self-isolate) until:  ? Your other symptoms have resolved (gotten better). And   ? You've had no fever--and no medicine that reduces fever--for 1 full day (24 hours). And   ? At least 10 days have passed since your symptoms started. (You may need to wait 20 days. Follow the advice of your care team.)  If you don't show symptoms, but testing showed that you have COVID-19:    Stay home and away from others (self-isolate) until at least 10 days have passed since the date of your first positive COVID-19 test.  During this time    Stay in your own room, even for meals. Use your own bathroom if you can.    Stay away from others in your home. No hugging, kissing or shaking hands. No visitors.    Don't go to work, school or anywhere else.    Clean \"high touch\" surfaces often (doorknobs, counters, handles). Use household cleaning spray or wipes.    You'll find a full list of  on the EPA website: www.epa.gov/pesticide-registration/list-n-disinfectants-use-against-sars-cov-2.    Cover your mouth and nose with a mask or other face covering to avoid spreading germs.    Wash your hands and face often. Use soap and water.    Caregivers in these groups are at risk for severe illness due to COVID-19:  ? People 65 years and older  ? People who live in a nursing home or long-term care facility  ? People with chronic disease (lung, heart, cancer, diabetes, kidney, liver, immunologic)  ? People who have a weakened immune system, including those who:    Are in cancer treatment    Take medicine that weakens the immune system, such as corticosteroids    Had a bone marrow or organ " transplant    Have an immune deficiency    Have poorly controlled HIV or AIDS    Are obese (body mass index of 40 or higher)    Smoke regularly    Caregivers should wear gloves while washing dishes, handling laundry and cleaning bedrooms and bathrooms.    Use caution when washing and drying laundry: Don't shake dirty laundry and use the warmest water setting that you can.    For more tips on managing your health at home, go to www.cdc.gov/coronavirus/2019-ncov/downloads/10Things.pdf.  How can I take care of myself at home?  1. Get lots of rest. Drink extra fluids (unless a doctor has told you not to).  2. Take Tylenol (acetaminophen) for fever or pain. If you have liver or kidney problems, ask your family doctor if it's okay to take Tylenol.   Adults can take either:   ? 650 mg (two 325 mg pills) every 4 to 6 hours, or   ? 1,000 mg (two 500 mg pills) every 8 hours as needed.  ? Note: Don't take more than 3,000 mg in one day. Acetaminophen is found in many medicines (both prescribed and over-the-counter medicines). Read all labels to be sure you don't take too much.   For children, check the Tylenol bottle for the right dose. The dose is based on the child's age or weight.  3. If you have other health problems (like cancer, heart failure, an organ transplant or severe kidney disease): Call your specialty clinic if you don't feel better in the next 2 days.  4. Know when to call 911. Emergency warning signs include:  ? Trouble breathing or shortness of breath  ? Pain or pressure in the chest that doesn't go away  ? Feeling confused like you haven't felt before, or not being able to wake up  ? Bluish-colored lips or face  5. Your doctor may have prescribed a blood thinner medicine. Follow their instructions.  Where can I get more information?     [employer] - About COVID-19:   [email or web address]://[email or web address]/    CDC - What to Do If You're Sick:  www.cdc.gov/coronavirus/2019-ncov/about/steps-when-sick.html    CDC - Ending Home Isolation: www.cdc.gov/coronavirus/2019-ncov/hcp/disposition-in-home-patients.html    CDC - Caring for Someone: www.cdc.gov/coronavirus/2019-ncov/if-you-are-sick/care-for-someone.html    [facility] - Interim Guidance for Hospital Discharge to Home: www.health.[facility].mn./diseases/coronavirus/hcp/hospdischarge.pdf    Below are the COVID-19 hotlines at the [place] Department of Health ([facility]). Interpreters are available.  ? For health questions: Call 193-633-3535 or 1-268.648.8539 (7 a.m. to 7 p.m.)  ? For questions about schools and childcare: Call 581-291-9960 or 1-430.609.8580 (7 a.m. to 7 p.m.)    For informational purposes only. Not to replace the advice of your health care provider. Clinically reviewed by Dr. Dashawn Goodrich.   Copyright   2020 [facility]. All rights reserved. [employer] 183063 - REV 01/05/21.    Decadron  Generic Name: Dexamethasone  Drug type  Decadron is a steroid. It helps prevent nausea (feeling sick to your stomach) and vomiting (throwing up). It can also work to stop some cancer cells from growing and helps to get rid of the cancer.  What this drug is used for  Leukemia, lymphoma, multiple myeloma or   How this drug is given  This is a pill taken by mouth. Take it with food to avoid an upset stomach. The size of your dose depends on many things. Your doctor will decide on the best dose for you.  Your dose:(date)  If you miss a dose, take it as soon as you think about it. If it is almost time for your next dose, skip it and take the dose at your normal time. Do not take two doses at the same time. If you miss two doses, call the clinic to find out what to do.  Make sure your health care team knows about all the medicines you take. This will help prevent drug interactions.  Side effects  Most people do not have all the side effects listed. Side effects usually go away after the treatment is  complete.  Common side effects  (occur in more than 3 out of 10 of people):    Increased hunger    Feeling fussy and grumpy    Trouble sleeping    Swelling in legs or feet    Heartburn    Muscle weakness    Wounds heal slowly    Increase in blood sugar.  Less common side effects  (occur in less than 3 out of 10 people):    Headaches    Feeling dizzy    Mood swings    Blurring vision and bone thinning (with long-term use).  Other information:   If you have any side effects, call us. We can help you cope with them.  For more information, see:  www.chemocare.com  www.nlm.nih.gov/medlineplus/druginformation.htm  www.cancer.gov/cancertopics/coping/chemotherapy-and-you  For informational purposes only. Not to replace the advice of your health care provider.  Developed in collaboration with [facility].  Copyright   2013 [employer]. All rights reserved. [employer] 801150 - REV 02/16.  For informational purposes only. Not to replace the advice of your health care provider.  Copyright   2018 [employer]. All rights reserved.

## 2021-09-17 NOTE — DISCHARGE SUMMARY
[facility]  Discharge Summary  Name: Henrietta Conner    MRN: [id number]  YOB: 1960    Age: 61 year old  Date of Discharge:  9/17/2021  Date of Admission: 9/10/2021  Primary Care Provider: Tati Oakley  Discharge Physician:  Aidan Aponte MD  Discharging Service:  Hospitalist      Hospital Course/Discharge Diagnoses:  Henrietta Conner is a 61 year old female admitted on 9/10/2021 with covid pneumonia.  She has a medical history pertinent for PE assoc wth smoking and OCPs.  Now non-smoker x 10 yrs, but underlying emphysematous changes on CT.       She was admitted for acute hypoxemic respiratory failure and started on steroids, remdesivir and given a dose of tocilizumab in the setting of very high inflammatory markers.  She did require high flow nasal cannula and now over the past few days has been able to wean down gradually and now is off O2.  She feels well, and will discharge home with 2 days more oral decadron to complete 10 days total.        Unvaccinated  Onset of symptoms: about 8/29/2021  Diagnosis of Covid: 9/7/2021  Toci given on 9/10.   Decadron and Remdesivir initiated on 9/10.       Problem List:   1. Acute hypoxic resp failure due to Covid Pneumonia/ARDS.  Now improving, gradually weaning O2.  --Completed remdesivir as above  --Continue Decadron, day 8 of 10.  2 days more after discharge.  --Event tocilizumab     2. COPD.  Emphysematous changes are noted on the CT scan but the patient has not required chronic inhaler therapy prior to this time.     3. Hypokalemia.  Replaced.       4.  Nodular changes seen on CT chest:  Will need follow-up CT in approximately 3 months to reevaluate nodular areas identified on CT scan.     5.  Non-severe protein calorie malnutrition         Discharge Disposition:  Discharged to home     Allergies:  Allergies   Allergen Reactions     Omnicef [Cefdinir] GI Disturbance     And vaginal yeast infection        Discharge  Medications:        Review of your medicines      START taking      Dose / Directions   dexamethasone 6 MG tablet  Commonly known as: DECADRON  Used for: 2019 novel coronavirus disease (COVID-19)      Dose: 6 mg  Start taking on: September 18, 2021  Take 1 tablet (6 mg) by mouth daily  Quantity: 2 tablet  Refills: 0        CONTINUE these medicines which have NOT CHANGED      Dose / Directions   albuterol 108 (90 Base) MCG/ACT inhaler  Commonly known as: PROAIR HFA/PROVENTIL HFA/VENTOLIN HFA  Used for: 2019 novel coronavirus disease (COVID-19)      Dose: 1-2 puff  Inhale 1-2 puffs into the lungs every 4 hours as needed for shortness of breath / dyspnea or wheezing  Quantity: 18 g  Refills: 1     calcium carbonate-vitamin D 600-400 MG-UNIT chewable tablet  Commonly known as: OS-TASIA      Dose: 1 chew tab  Take 1 chew tab by mouth 2 times daily  Refills: 0     diphenhydrAMINE 25 MG tablet  Commonly known as: BENADRYL      Dose: 25-50 mg  Take 25-50 mg by mouth every 6 hours as needed for itching or allergies  Refills: 0     EXCEDRIN PO      Take by mouth as needed  Refills: 0     fish oil-omega-3 fatty acids 1000 MG capsule      2 capsules daily  Refills: 0     GLUCOSAMINE PO      2 tablets daily  Refills: 0     MAGNESIUM OXIDE PO      Dose: 500 mg  Take 500 mg by mouth daily  Refills: 0     Multi-vitamin tablet  Generic drug: multivitamin w/minerals      1 tablet daily  Refills: 0     TURMERIC PO      Refills: 0        STOP taking    BIOTIN PO              Where to get your medicines      These medications were sent to [facility] [id number] [facility] [address]    Phone: [phone]     dexamethasone 6 MG tablet         Condition on Discharge:  Discharge condition: Stable       Code status on discharge: Full Code     History of Illness:  See detailed admission note for full details.    Physical Exam:  Vital signs:  Temp: 97  F (36.1  C) Temp src:  "Axillary BP: 105/72 Pulse: 80   Resp: 20 SpO2: 95 % O2 Device: None (Room air) Oxygen Delivery: 1 LPM   Weight: 61.6 kg (135 lb 14.4 oz)  Estimated body mass index is 21.61 kg/m  as calculated from the following:    Height as of 12/9/20: 1.689 m (5' 6.5\").    Weight as of this encounter: 61.6 kg (135 lb 14.4 oz).    Wt Readings from Last 1 Encounters:   09/10/21 61.6 kg (135 lb 14.4 oz)     General: Alert, awake, no acute distress.  HEENT: NC/AT, eyes anicteric, external occular movements intact, face symmetric.    Cardiac: RRR, S1, S2.  No murmurs appreciated.  Pulmonary: Normal chest rise, normal work of breathing.  Faint rales bilaterally.  Abdomen: soft, non-tender, non-distended.  Bowel Sounds Present.  No guarding.  Extremities: no deformities.  Warm, well perfused.  Skin: no rashes or lesions noted.  Warm and Dry.  Neuro: No focal deficits noted.  Speech clear.  Coordination and strength grossly normal.  Psych: Appropriate affect.    Procedures other than Imaging:  none     Imaging:  Results for orders placed or performed during the hospital encounter of 09/10/21   CT Chest Pulmonary Embolism w Contrast    Narrative    EXAM: CT CHEST PULMONARY EMBOLISM W CONTRAST  LOCATION: [facility]  DATE/TIME: 9/10/2021 4:29 AM    INDICATION: Hypoxia.  COMPARISON: 01/12/2021.  TECHNIQUE: CT chest pulmonary angiogram during arterial phase injection of IV contrast. Multiplanar reformats and MIP reconstructions were performed. Dose reduction techniques were used.   CONTRAST: 56 mL Isovue-370.    FINDINGS:  ANGIOGRAM CHEST: Pulmonary arteries are normal caliber and negative for pulmonary emboli. Thoracic aorta is negative for dissection. No CT evidence of right heart strain.    LUNGS AND PLEURA: Emphysematous changes in the lungs. Moderately prominent areas of patchy consolidation in both lower lobes with some scattered nodular areas of consolidation elsewhere. There is also a background of patchy " groundglass opacities in the   lungs with findings concerning for underlying pneumonitis. COVID-19 pneumonitis could have this appearance. Clinical correlation. Given some patchy nodular areas of consolidation, follow-up imaging is recommended to monitor for resolution. Numerous   benign calcified granulomas throughout both lungs. Trace amount of pleural fluid bilaterally.    MEDIASTINUM/AXILLAE: Scattered low-density hilar and mediastinal lymph nodes are likely reactive in nature. Normal heart size. No pericardial effusion. Normal caliber thoracic aorta. Esophagus is grossly negative.    CORONARY ARTERY CALCIFICATION: None.    UPPER ABDOMEN: Normal.    MUSCULOSKELETAL: Normal.      Impression    IMPRESSION:  1.  Negative for pulmonary embolism.    2.  Combination of patchy areas of consolidation as well as more diffuse groundglass infiltrate with an appearance suggestive of pneumonitis and could be seen with COVID-19 pneumonitis. Clinical correlation.    3.  Emphysematous changes in the lungs. Given some nodular areas of consolidation, recommend follow-up chest CT in 3 months for reevaluation following appropriate therapy to monitor for resolution and exclude any underlying lesions.    4.  Evidence of prior granulomatous disease.    5.  Trace amount of pleural fluid bilaterally.                      Consultations:  No consultations were requested during this admission.       Recent Lab Results:  Recent Labs   Lab 09/12/21  1816   PHV 7.46*   PO2V 64*   PCO2V 40   HCO3V 28     Recent Labs   Lab 09/14/21  0724 09/13/21  0756 09/12/21  0753   WBC 7.6 8.8 11.6*   HGB 13.2 12.4 11.5*   HCT 40.5 38.2 35.9   MCV 92 92 92   * 509* 510*          Lab Results   Component Value Date     09/14/2021     09/13/2021     09/12/2021     12/09/2020     12/19/2019     10/23/2018    Lab Results   Component Value Date    CHLORIDE 105 09/14/2021    CHLORIDE 107 09/13/2021    CHLORIDE 108  09/12/2021    CHLORIDE 110 12/09/2020    CHLORIDE 109 12/19/2019    CHLORIDE 108 10/23/2018    Lab Results   Component Value Date    BUN 25 09/14/2021    BUN 26 09/13/2021    BUN 27 09/12/2021    BUN 22 12/09/2020    BUN 21 12/19/2019    BUN 17 10/23/2018      Lab Results   Component Value Date    POTASSIUM 3.9 09/17/2021    POTASSIUM 3.9 09/16/2021    POTASSIUM 4.0 09/14/2021    POTASSIUM 3.8 12/09/2020    POTASSIUM 3.9 12/19/2019    POTASSIUM 4.0 10/23/2018    Lab Results   Component Value Date    CO2 30 09/14/2021    CO2 31 09/13/2021    CO2 29 09/12/2021    CO2 27 12/09/2020    CO2 26 12/19/2019    CO2 26 10/23/2018    Lab Results   Component Value Date    CR 0.63 09/14/2021    CR 0.60 09/13/2021    CR 0.68 09/12/2021    CR 0.71 12/09/2020    CR 0.74 12/19/2019    CR 0.74 10/23/2018             Pending Results:    Unresulted Labs Ordered in the Past 30 Days of this Admission     No orders found from 8/11/2021 to 9/11/2021.           Discharge Instructions and Follow-Up:   Discharge Procedure Orders   COVID-19 GetWell Loop Referral   Standing Status: Future   Referral Priority: Routine Referral Type: Consultation   Number of Visits Requested: 1     Reason for your hospital stay   Order Comments: You were hospitalized for covid pneumonia     Contact provider   Order Comments: Contact your primary care provider if If increased trouble breathing, new arm/leg swelling, dizziness/passing out, falls, bleeding that doesn't stop, or uncontrolled pain.     Discharge - Quarantine/Isolation Instruction   Order Comments: Date of symptom onset:     Date of first positive test:    Stay home and away from others (self-isolate) for at least 20 days from when your symptoms started And...   You've had no fevers and no medicine that reduces fever for 1 full day (24 hours)  And...   Your other symptoms have resolved (gotten better).     Activity   Order Comments: Your activity upon discharge: activity as tolerated     Order Specific  Question Answer Comments   Is discharge order? Yes      Follow-up and recommended labs and tests    Order Comments: Please follow up routinely with your primary care doctor.  You need to arrange to have a follow up CT scan of your chest in 3 months due to some nodular changes seen here on your scan.     Diet   Order Comments: Follow this diet upon discharge: Orders Placed This Encounter      Snacks/Supplements Adult: Ensure Enlive; Between Meals      Combination Diet Regular Diet Adult     Order Specific Question Answer Comments   Is discharge order? Yes        Total time spent in face to face contact with the patient and coordinating discharge was:  50 Minutes.

## 2021-10-13 ENCOUNTER — MYC MEDICAL ADVICE (OUTPATIENT)
Dept: FAMILY MEDICINE | Facility: CLINIC | Age: 61
End: 2021-10-13

## 2021-10-24 ENCOUNTER — HEALTH MAINTENANCE LETTER (OUTPATIENT)
Age: 61
End: 2021-10-24

## 2021-11-16 ENCOUNTER — OFFICE VISIT (OUTPATIENT)
Dept: FAMILY MEDICINE | Facility: CLINIC | Age: 61
End: 2021-11-16
Payer: COMMERCIAL

## 2021-11-16 VITALS
DIASTOLIC BLOOD PRESSURE: 70 MMHG | SYSTOLIC BLOOD PRESSURE: 104 MMHG | WEIGHT: 148.4 LBS | BODY MASS INDEX: 23.59 KG/M2 | OXYGEN SATURATION: 95 % | TEMPERATURE: 98.2 F | HEART RATE: 95 BPM

## 2021-11-16 DIAGNOSIS — J12.82 PNEUMONIA DUE TO 2019 NOVEL CORONAVIRUS: Primary | ICD-10-CM

## 2021-11-16 DIAGNOSIS — R91.8 PULMONARY NODULES: ICD-10-CM

## 2021-11-16 DIAGNOSIS — U07.1 PNEUMONIA DUE TO 2019 NOVEL CORONAVIRUS: Primary | ICD-10-CM

## 2021-11-16 DIAGNOSIS — L65.9 HAIR LOSS: ICD-10-CM

## 2021-11-16 LAB
ANION GAP SERPL CALCULATED.3IONS-SCNC: 6 MMOL/L (ref 3–14)
BUN SERPL-MCNC: 17 MG/DL (ref 7–30)
CALCIUM SERPL-MCNC: 9.6 MG/DL (ref 8.5–10.1)
CHLORIDE BLD-SCNC: 109 MMOL/L (ref 94–109)
CO2 SERPL-SCNC: 26 MMOL/L (ref 20–32)
CREAT SERPL-MCNC: 0.68 MG/DL (ref 0.52–1.04)
ERYTHROCYTE [DISTWIDTH] IN BLOOD BY AUTOMATED COUNT: 12.7 % (ref 10–15)
GFR SERPL CREATININE-BSD FRML MDRD: >90 ML/MIN/1.73M2
GLUCOSE BLD-MCNC: 95 MG/DL (ref 70–99)
HCT VFR BLD AUTO: 40.4 % (ref 35–47)
HGB BLD-MCNC: 13.3 G/DL (ref 11.7–15.7)
MCH RBC QN AUTO: 30.8 PG (ref 26.5–33)
MCHC RBC AUTO-ENTMCNC: 32.9 G/DL (ref 31.5–36.5)
MCV RBC AUTO: 94 FL (ref 78–100)
PLATELET # BLD AUTO: 269 10E3/UL (ref 150–450)
POTASSIUM BLD-SCNC: 4.1 MMOL/L (ref 3.4–5.3)
RBC # BLD AUTO: 4.32 10E6/UL (ref 3.8–5.2)
SODIUM SERPL-SCNC: 141 MMOL/L (ref 133–144)
TSH SERPL DL<=0.005 MIU/L-ACNC: 1.66 MU/L (ref 0.4–4)
WBC # BLD AUTO: 4.9 10E3/UL (ref 4–11)

## 2021-11-16 PROCEDURE — 99213 OFFICE O/P EST LOW 20 MIN: CPT | Mod: 25 | Performed by: PHYSICIAN ASSISTANT

## 2021-11-16 PROCEDURE — 80048 BASIC METABOLIC PNL TOTAL CA: CPT | Performed by: PHYSICIAN ASSISTANT

## 2021-11-16 PROCEDURE — 90471 IMMUNIZATION ADMIN: CPT | Performed by: PHYSICIAN ASSISTANT

## 2021-11-16 PROCEDURE — 90682 RIV4 VACC RECOMBINANT DNA IM: CPT | Performed by: PHYSICIAN ASSISTANT

## 2021-11-16 PROCEDURE — 84443 ASSAY THYROID STIM HORMONE: CPT | Performed by: PHYSICIAN ASSISTANT

## 2021-11-16 PROCEDURE — 36415 COLL VENOUS BLD VENIPUNCTURE: CPT | Performed by: PHYSICIAN ASSISTANT

## 2021-11-16 PROCEDURE — 85027 COMPLETE CBC AUTOMATED: CPT | Performed by: PHYSICIAN ASSISTANT

## 2021-11-16 RX ORDER — ALBUTEROL SULFATE 90 UG/1
1-2 AEROSOL, METERED RESPIRATORY (INHALATION) EVERY 4 HOURS PRN
Qty: 18 G | Refills: 3 | Status: SHIPPED | OUTPATIENT
Start: 2021-11-16 | End: 2022-01-19

## 2021-11-16 ASSESSMENT — ANXIETY QUESTIONNAIRES
7. FEELING AFRAID AS IF SOMETHING AWFUL MIGHT HAPPEN: NOT AT ALL
GAD7 TOTAL SCORE: 0
IF YOU CHECKED OFF ANY PROBLEMS ON THIS QUESTIONNAIRE, HOW DIFFICULT HAVE THESE PROBLEMS MADE IT FOR YOU TO DO YOUR WORK, TAKE CARE OF THINGS AT HOME, OR GET ALONG WITH OTHER PEOPLE: NOT DIFFICULT AT ALL
6. BECOMING EASILY ANNOYED OR IRRITABLE: NOT AT ALL
1. FEELING NERVOUS, ANXIOUS, OR ON EDGE: NOT AT ALL
3. WORRYING TOO MUCH ABOUT DIFFERENT THINGS: NOT AT ALL
5. BEING SO RESTLESS THAT IT IS HARD TO SIT STILL: NOT AT ALL
2. NOT BEING ABLE TO STOP OR CONTROL WORRYING: NOT AT ALL

## 2021-11-16 ASSESSMENT — PATIENT HEALTH QUESTIONNAIRE - PHQ9: 5. POOR APPETITE OR OVEREATING: NOT AT ALL

## 2021-11-16 NOTE — PROGRESS NOTES
Assessment & Plan     Pneumonia due to 2019 novel coronavirus  resolved  - CBC with platelets; Future  - albuterol (PROAIR HFA/PROVENTIL HFA/VENTOLIN HFA) 108 (90 Base) MCG/ACT inhaler; Inhale 1-2 puffs into the lungs every 4 hours as needed for shortness of breath / dyspnea or wheezing  - Basic metabolic panel  (Ca, Cl, CO2, Creat, Gluc, K, Na, BUN); Future  - CBC with platelets  - Basic metabolic panel  (Ca, Cl, CO2, Creat, Gluc, K, Na, BUN)    Pulmonary nodules  Await CT  - CT Chest w Contrast; Future    Hair loss    - TSH with free T4 reflex; Future  - TSH with free T4 reflex          Return in about 6 months (around 5/16/2022).    Tati Oakley PA-C  [facility]    Arjun Jones is a 61 year old who presents for the following health issues     HPI       Hospital Follow-up Visit:    Hospital/Nursing Home/IP Rehab Facility: [facility]  Date of Admission: 9/10/21  Date of Discharge: 9/17/21  Reason(s) for Admission:  Covid pneumonia      Was your hospitalization related to COVID-19? YES   How are you feeling today? Much better  In the past 24 hours have you had shortness of breath when speaking, walking, or climbing stairs? My breathing issues have improved  Do you have a cough? I don't have a cough  When is the last time you had a fever greater than 100? september  Are you having any other symptoms? hair loss and left arm goes numb sometimes   Do you have any other stressors you would like to discuss with your provider? No         Was the patient in the ICU or did the patient experience delirium during hospitalization?  No    Problems taking medications regularly:  None  Medication changes since discharge: None  Problems adhering to non-medication therapy:  None    Summary of hospitalization:  [facility] discharge summary reviewed  Diagnostic Tests/Treatments reviewed.  Follow up needed: none  Other Healthcare Providers Involved in  Patient s Care:         None  Update since discharge: improved.  Post Discharge Medication Reconciliation: discharge medications reconciled, continue medications without change.  Plan of care communicated with patient              Review of Systems   Constitutional, HEENT, cardiovascular, pulmonary, gi and gu systems are negative, except as otherwise noted.      Objective    /70 (BP Location: Right arm, Patient Position: Sitting, Cuff Size: Adult Regular)   Pulse 95   Temp 98.2  F (36.8  C) (Oral)   Wt 67.3 kg (148 lb 6.4 oz)   LMP 06/01/2006   SpO2 95%   BMI 23.59 kg/m    Body mass index is 23.59 kg/m .  Physical Exam   GENERAL APPEARANCE: healthy, alert and no distress  HENT: ear canals and TM's normal and nose and mouth without ulcers or lesions  RESP: lungs clear to auscultation - no rales, rhonchi or wheezes  CV: regular rates and rhythm, normal S1 S2, no S3 or S4 and no murmur, click or rub  SKIN: no suspicious lesions or rashes  NEURO: Normal strength and tone, mentation intact and speech normal  PSYCH: mentation appears normal and affect normal/bright

## 2021-11-17 ASSESSMENT — PATIENT HEALTH QUESTIONNAIRE - PHQ9: SUM OF ALL RESPONSES TO PHQ QUESTIONS 1-9: 0

## 2021-11-17 ASSESSMENT — ANXIETY QUESTIONNAIRES: GAD7 TOTAL SCORE: 0

## 2021-12-16 ENCOUNTER — HOSPITAL ENCOUNTER (OUTPATIENT)
Dept: CT IMAGING | Facility: CLINIC | Age: 61
Discharge: HOME | End: 2021-12-16
Attending: PHYSICIAN ASSISTANT | Admitting: PHYSICIAN ASSISTANT
Payer: COMMERCIAL

## 2021-12-16 DIAGNOSIS — R91.8 PULMONARY NODULES: ICD-10-CM

## 2021-12-16 PROCEDURE — 250N000011 HC RX IP 250 OP 636: Performed by: PHYSICIAN ASSISTANT

## 2021-12-16 PROCEDURE — 71250 CT THORAX DX C-: CPT

## 2021-12-16 PROCEDURE — 250N000009 HC RX 250: Performed by: PHYSICIAN ASSISTANT

## 2021-12-16 RX ORDER — IOPAMIDOL 755 MG/ML
500 INJECTION, SOLUTION INTRAVASCULAR ONCE
Status: DISCONTINUED | OUTPATIENT
Start: 2021-12-16 | End: 2021-12-16 | Stop reason: CLARIF

## 2022-01-16 SDOH — HEALTH STABILITY: PHYSICAL HEALTH: ON AVERAGE, HOW MANY DAYS PER WEEK DO YOU ENGAGE IN MODERATE TO STRENUOUS EXERCISE (LIKE A BRISK WALK)?: 3 DAYS

## 2022-01-16 SDOH — ECONOMIC STABILITY: FOOD INSECURITY: WITHIN THE PAST 12 MONTHS, THE FOOD YOU BOUGHT JUST DIDN'T LAST AND YOU DIDN'T HAVE MONEY TO GET MORE.: NEVER TRUE

## 2022-01-16 SDOH — ECONOMIC STABILITY: INCOME INSECURITY: HOW HARD IS IT FOR YOU TO PAY FOR THE VERY BASICS LIKE FOOD, HOUSING, MEDICAL CARE, AND HEATING?: NOT VERY HARD

## 2022-01-16 SDOH — ECONOMIC STABILITY: INCOME INSECURITY: IN THE LAST 12 MONTHS, WAS THERE A TIME WHEN YOU WERE NOT ABLE TO PAY THE MORTGAGE OR RENT ON TIME?: NO

## 2022-01-16 SDOH — HEALTH STABILITY: PHYSICAL HEALTH: ON AVERAGE, HOW MANY MINUTES DO YOU ENGAGE IN EXERCISE AT THIS LEVEL?: 10 MIN

## 2022-01-16 SDOH — ECONOMIC STABILITY: FOOD INSECURITY: WITHIN THE PAST 12 MONTHS, YOU WORRIED THAT YOUR FOOD WOULD RUN OUT BEFORE YOU GOT MONEY TO BUY MORE.: NEVER TRUE

## 2022-01-16 ASSESSMENT — ENCOUNTER SYMPTOMS
HEARTBURN: 0
HEADACHES: 0
SORE THROAT: 0
FREQUENCY: 0
SHORTNESS OF BREATH: 1
CHILLS: 0
HEMATURIA: 0
PARESTHESIAS: 1
NERVOUS/ANXIOUS: 0
MYALGIAS: 0
NAUSEA: 0
FEVER: 0
ARTHRALGIAS: 0
COUGH: 0
CONSTIPATION: 0
EYE PAIN: 0
DIZZINESS: 0
HEMATOCHEZIA: 0
ABDOMINAL PAIN: 0
BREAST MASS: 0
DIARRHEA: 0
PALPITATIONS: 0
JOINT SWELLING: 0
WEAKNESS: 0
DYSURIA: 0

## 2022-01-16 ASSESSMENT — SOCIAL DETERMINANTS OF HEALTH (SDOH)
HOW OFTEN DO YOU GET TOGETHER WITH FRIENDS OR RELATIVES?: THREE TIMES A WEEK
HOW OFTEN DO YOU ATTEND CHURCH OR RELIGIOUS SERVICES?: 1 TO 4 TIMES PER YEAR
DO YOU BELONG TO ANY CLUBS OR ORGANIZATIONS SUCH AS CHURCH GROUPS UNIONS, FRATERNAL OR ATHLETIC GROUPS, OR SCHOOL GROUPS?: YES
IN A TYPICAL WEEK, HOW MANY TIMES DO YOU TALK ON THE PHONE WITH FAMILY, FRIENDS, OR NEIGHBORS?: MORE THAN THREE TIMES A WEEK

## 2022-01-16 ASSESSMENT — LIFESTYLE VARIABLES
HOW MANY STANDARD DRINKS CONTAINING ALCOHOL DO YOU HAVE ON A TYPICAL DAY: PATIENT DECLINED
HOW OFTEN DO YOU HAVE A DRINK CONTAINING ALCOHOL: NEVER
HOW OFTEN DO YOU HAVE SIX OR MORE DRINKS ON ONE OCCASION: NEVER

## 2022-01-19 ENCOUNTER — OFFICE VISIT (OUTPATIENT)
Dept: FAMILY MEDICINE | Facility: CLINIC | Age: 62
End: 2022-01-19
Payer: COMMERCIAL

## 2022-01-19 VITALS
WEIGHT: 153.2 LBS | HEART RATE: 84 BPM | HEIGHT: 67 IN | BODY MASS INDEX: 24.04 KG/M2 | TEMPERATURE: 97.7 F | OXYGEN SATURATION: 99 % | DIASTOLIC BLOOD PRESSURE: 72 MMHG | SYSTOLIC BLOOD PRESSURE: 100 MMHG

## 2022-01-19 DIAGNOSIS — Z00.00 ROUTINE HISTORY AND PHYSICAL EXAMINATION OF ADULT: Primary | ICD-10-CM

## 2022-01-19 DIAGNOSIS — Z12.31 VISIT FOR SCREENING MAMMOGRAM: ICD-10-CM

## 2022-01-19 LAB
ALBUMIN SERPL-MCNC: 3.6 G/DL (ref 3.4–5)
ALP SERPL-CCNC: 63 U/L (ref 40–150)
ALT SERPL W P-5'-P-CCNC: 26 U/L (ref 0–50)
ANION GAP SERPL CALCULATED.3IONS-SCNC: 5 MMOL/L (ref 3–14)
AST SERPL W P-5'-P-CCNC: 17 U/L (ref 0–45)
BILIRUB SERPL-MCNC: 0.3 MG/DL (ref 0.2–1.3)
BUN SERPL-MCNC: 20 MG/DL (ref 7–30)
CALCIUM SERPL-MCNC: 9.5 MG/DL (ref 8.5–10.1)
CHLORIDE BLD-SCNC: 109 MMOL/L (ref 94–109)
CHOLEST SERPL-MCNC: 230 MG/DL
CO2 SERPL-SCNC: 28 MMOL/L (ref 20–32)
CREAT SERPL-MCNC: 0.74 MG/DL (ref 0.52–1.04)
ERYTHROCYTE [DISTWIDTH] IN BLOOD BY AUTOMATED COUNT: 12.1 % (ref 10–15)
FASTING STATUS PATIENT QL REPORTED: YES
GFR SERPL CREATININE-BSD FRML MDRD: >90 ML/MIN/1.73M2
GLUCOSE BLD-MCNC: 99 MG/DL (ref 70–99)
HCT VFR BLD AUTO: 41.9 % (ref 35–47)
HDLC SERPL-MCNC: 62 MG/DL
HGB BLD-MCNC: 13.5 G/DL (ref 11.7–15.7)
LDLC SERPL CALC-MCNC: 152 MG/DL
MCH RBC QN AUTO: 29.1 PG (ref 26.5–33)
MCHC RBC AUTO-ENTMCNC: 32.2 G/DL (ref 31.5–36.5)
MCV RBC AUTO: 90 FL (ref 78–100)
NONHDLC SERPL-MCNC: 168 MG/DL
PLATELET # BLD AUTO: 269 10E3/UL (ref 150–450)
POTASSIUM BLD-SCNC: 4 MMOL/L (ref 3.4–5.3)
PROT SERPL-MCNC: 6.6 G/DL (ref 6.8–8.8)
RBC # BLD AUTO: 4.64 10E6/UL (ref 3.8–5.2)
SODIUM SERPL-SCNC: 142 MMOL/L (ref 133–144)
TRIGL SERPL-MCNC: 82 MG/DL
WBC # BLD AUTO: 3.8 10E3/UL (ref 4–11)

## 2022-01-19 PROCEDURE — 80061 LIPID PANEL: CPT | Performed by: PHYSICIAN ASSISTANT

## 2022-01-19 PROCEDURE — 99396 PREV VISIT EST AGE 40-64: CPT | Performed by: PHYSICIAN ASSISTANT

## 2022-01-19 PROCEDURE — 80053 COMPREHEN METABOLIC PANEL: CPT | Performed by: PHYSICIAN ASSISTANT

## 2022-01-19 PROCEDURE — 85027 COMPLETE CBC AUTOMATED: CPT | Performed by: PHYSICIAN ASSISTANT

## 2022-01-19 PROCEDURE — 36415 COLL VENOUS BLD VENIPUNCTURE: CPT | Performed by: PHYSICIAN ASSISTANT

## 2022-01-19 ASSESSMENT — ENCOUNTER SYMPTOMS
WEAKNESS: 0
CHILLS: 0
ARTHRALGIAS: 0
SORE THROAT: 0
MYALGIAS: 0
SHORTNESS OF BREATH: 1
HEADACHES: 0
ABDOMINAL PAIN: 0
CONSTIPATION: 0
COUGH: 0
HEMATURIA: 0
FREQUENCY: 0
DYSURIA: 0
DIZZINESS: 0
EYE PAIN: 0
PALPITATIONS: 0
DIARRHEA: 0
HEMATOCHEZIA: 0
NAUSEA: 0
NERVOUS/ANXIOUS: 0
BREAST MASS: 0
FEVER: 0
PARESTHESIAS: 1
JOINT SWELLING: 0
HEARTBURN: 0

## 2022-01-19 ASSESSMENT — MIFFLIN-ST. JEOR: SCORE: 1284.6

## 2022-01-19 NOTE — PROGRESS NOTES
SUBJECTIVE:   CC: Henrietta Conner is an 61 year old woman who presents for preventive health visit.         Healthy Habits:     Getting at least 3 servings of Calcium per day:  Yes    Bi-annual eye exam:  Yes    Dental care twice a year:  Yes    Sleep apnea or symptoms of sleep apnea:  None    Diet:  Carbohydrate counting    Frequency of exercise:  4-5 days/week    Duration of exercise:  15-30 minutes    Taking medications regularly:  Yes    Medication side effects:  Other    PHQ-2 Total Score: 0    Additional concerns today:  No      Today's PHQ-2 Score:   PHQ-2 ( 1999 Pf[employer]) 1/16/2022   Q1: Little interest or pleasure in doing things 0   Q2: Feeling down, depressed or hopeless 0   PHQ-2 Score 0   PHQ-2 Total Score (12-17 Years)- Positive if 3 or more points; Administer PHQ-A if positive -   Q1: Little interest or pleasure in doing things Not at all   Q2: Feeling down, depressed or hopeless Not at all   PHQ-2 Score 0       Abuse: Current or Past (Physical, Sexual or Emotional) - No  Do you feel safe in your environment? Yes        Social History     Tobacco Use     Smoking status: Former Smoker     Packs/day: 1.00     Years: 30.00     Pack years: 30.00     Types: Cigarettes     Smokeless tobacco: Never Used   Substance Use Topics     Alcohol use: No         Alcohol Use 1/16/2022   Prescreen: >3 drinks/day or >7 drinks/week? Not Applicable   Prescreen: >3 drinks/day or >7 drinks/week? -       Reviewed orders with patient.  Reviewed health maintenance and updated orders accordingly - Yes  Lab work is in process    Breast Cancer Screening:    Breast CA Risk Assessment (FHS-7) 1/16/2022   Do you have a family history of breast, colon, or ovarian cancer? No / Unknown         Mammogram Screening: Recommended mammography every 1-2 years with patient discussion and risk factor consideration  Pertinent mammograms are reviewed under the imaging tab.    History of abnormal Pap smear: NO - age 30-65 PAP every 5 years  "with negative HPV co-testing recommended  PAP / HPV 10/8/2010 9/1/2009 8/4/2008   PAP (Historical) NIL NIL NIL     Reviewed and updated as needed this visit by clinical staff  Tobacco  Allergies      Fam Hx  Soc Hx       Reviewed and updated as needed this visit by Provider               Past Medical History:   Diagnosis Date     Endometriosis of pelvic peritoneum      Hyperlipidemia      Pneumonia due to 2019 novel coronavirus 09/2021    Only received 1 of 2 vaccination doses at time of diagnosis     Pulmonary embolism         Review of Systems   Constitutional: Negative for chills and fever.   HENT: Negative for congestion, ear pain, hearing loss and sore throat.    Eyes: Negative for pain and visual disturbance.   Respiratory: Positive for shortness of breath. Negative for cough.    Cardiovascular: Negative for chest pain, palpitations and peripheral edema.   Gastrointestinal: Negative for abdominal pain, constipation, diarrhea, heartburn, hematochezia and nausea.   Breasts:  Negative for tenderness, breast mass and discharge.   Genitourinary: Negative for dysuria, frequency, genital sores, hematuria, pelvic pain, urgency, vaginal bleeding and vaginal discharge.   Musculoskeletal: Negative for arthralgias, joint swelling and myalgias.   Skin: Negative for rash.   Neurological: Positive for paresthesias. Negative for dizziness, weakness and headaches.   Psychiatric/Behavioral: Negative for mood changes. The patient is not nervous/anxious.           OBJECTIVE:   Temp 97.7  F (36.5  C) (Oral)   Ht 1.689 m (5' 6.5\")   Wt 69.5 kg (153 lb 3.2 oz)   LMP 06/01/2006   BMI 24.36 kg/m    Physical Exam  GENERAL APPEARANCE: healthy, alert and no distress  EYES: Eyes grossly normal to inspection, PERRL and conjunctivae and sclerae normal  HENT: ear canals and TM's normal, nose and mouth without ulcers or lesions, oropharynx clear and oral mucous membranes moist  NECK: no adenopathy, no asymmetry, masses, or scars and " "thyroid normal to palpation  RESP: lungs clear to auscultation - no rales, rhonchi or wheezes  BREAST: normal without masses, tenderness or nipple discharge and no palpable axillary masses or adenopathy  CV: regular rate and rhythm, normal S1 S2, no S3 or S4, no murmur, click or rub, no peripheral edema and peripheral pulses strong  ABDOMEN: soft, nontender, no hepatosplenomegaly, no masses and bowel sounds normal  MS: no musculoskeletal defects are noted and gait is age appropriate without ataxia  SKIN: no suspicious lesions or rashes  NEURO: Normal strength and tone, sensory exam grossly normal, mentation intact and speech normal  PSYCH: mentation appears normal and affect normal/bright        ASSESSMENT/PLAN:   (Z00.00) Routine history and physical examination of adult  (primary encounter diagnosis)  Comment:   Plan: CBC with platelets, Comprehensive metabolic         panel (BMP + Alb, Alk Phos, ALT, AST, Total.         Bili, TP), Lipid panel reflex to direct LDL         Fasting            (Z12.31) Visit for screening mammogram  Comment:   Plan: *MA Screening Digital Bilateral              Patient has been advised of split billing requirements and indicates understanding: Yes    COUNSELING:  Reviewed preventive health counseling, as reflected in patient instructions       Regular exercise       Healthy diet/nutrition       Vision screening    Estimated body mass index is 24.36 kg/m  as calculated from the following:    Height as of this encounter: 1.689 m (5' 6.5\").    Weight as of this encounter: 69.5 kg (153 lb 3.2 oz).        She reports that she has quit smoking. Her smoking use included cigarettes. She has a 30.00 pack-year smoking history. She has never used smokeless tobacco.      Counseling Resources:  ATP IV Guidelines  Pooled Cohorts Equation Calculator  Breast Cancer Risk Calculator  BRCA-Related Cancer Risk Assessment: FHS-7 Tool  FRAX Risk Assessment  ICSI Preventive Guidelines  Dietary Guidelines " for Americans, 2010  USDA's MyPlate  ASA Prophylaxis  Lung CA Screening    Tati Oakley PA-C  [facility]

## 2022-02-10 ENCOUNTER — MYC MEDICAL ADVICE (OUTPATIENT)
Dept: FAMILY MEDICINE | Facility: CLINIC | Age: 62
End: 2022-02-10
Payer: COMMERCIAL

## 2022-02-10 DIAGNOSIS — Z12.11 SCREENING FOR COLORECTAL CANCER: Primary | ICD-10-CM

## 2022-02-10 DIAGNOSIS — Z12.12 SCREENING FOR COLORECTAL CANCER: Primary | ICD-10-CM

## 2022-02-24 ENCOUNTER — ANCILLARY PROCEDURE (OUTPATIENT)
Dept: MAMMOGRAPHY | Facility: CLINIC | Age: 62
End: 2022-02-24
Attending: PHYSICIAN ASSISTANT
Payer: COMMERCIAL

## 2022-02-24 DIAGNOSIS — Z12.31 VISIT FOR SCREENING MAMMOGRAM: ICD-10-CM

## 2022-02-24 PROCEDURE — 77067 SCR MAMMO BI INCL CAD: CPT | Mod: TC | Performed by: RADIOLOGY

## 2022-04-04 LAB — COLOGUARD-ABSTRACT: POSITIVE

## 2022-04-11 DIAGNOSIS — R19.5 POSITIVE COLORECTAL CANCER SCREENING USING COLOGUARD TEST: Primary | ICD-10-CM

## 2022-04-12 ENCOUNTER — TELEPHONE (OUTPATIENT)
Dept: FAMILY MEDICINE | Facility: CLINIC | Age: 62
End: 2022-04-12
Payer: COMMERCIAL

## 2022-04-12 NOTE — RESULT ENCOUNTER NOTE
Karen,    Your cologuard test was positive. This means a colonoscopy is indicated. I have ordered this for you. [facility]  will call you to set this up.     Tati Oakley PA-C

## 2022-04-12 NOTE — TELEPHONE ENCOUNTER
CHRISSY Arroyo, that Cologuard left message at [facility] to call [phone] regarding case id [id number]. Verifying positive result received. See below.    BEVERLY Simmons,     Your cologuard test was positive. This means a colonoscopy is indicated. I have ordered this for you. [facility]  will call you to set this up.      Tati Oakley PA-C

## 2022-04-18 DIAGNOSIS — Z11.59 ENCOUNTER FOR SCREENING FOR OTHER VIRAL DISEASES: Primary | ICD-10-CM

## 2022-05-03 ENCOUNTER — LAB (OUTPATIENT)
Dept: URGENT CARE | Facility: URGENT CARE | Age: 62
End: 2022-05-03
Attending: INTERNAL MEDICINE
Payer: COMMERCIAL

## 2022-05-03 DIAGNOSIS — Z11.59 ENCOUNTER FOR SCREENING FOR OTHER VIRAL DISEASES: ICD-10-CM

## 2022-05-03 LAB — SARS-COV-2 RNA RESP QL NAA+PROBE: NEGATIVE

## 2022-05-03 PROCEDURE — U0005 INFEC AGEN DETEC AMPLI PROBE: HCPCS

## 2022-05-03 PROCEDURE — U0003 INFECTIOUS AGENT DETECTION BY NUCLEIC ACID (DNA OR RNA); SEVERE ACUTE RESPIRATORY SYNDROME CORONAVIRUS 2 (SARS-COV-2) (CORONAVIRUS DISEASE [COVID-19]), AMPLIFIED PROBE TECHNIQUE, MAKING USE OF HIGH THROUGHPUT TECHNOLOGIES AS DESCRIBED BY CMS-2020-01-R: HCPCS

## 2022-05-06 ENCOUNTER — HOSPITAL ENCOUNTER (OUTPATIENT)
Facility: CLINIC | Age: 62
Discharge: HOME | End: 2022-05-06
Attending: INTERNAL MEDICINE | Admitting: INTERNAL MEDICINE
Payer: COMMERCIAL

## 2022-05-06 VITALS
HEART RATE: 73 BPM | HEIGHT: 66 IN | DIASTOLIC BLOOD PRESSURE: 57 MMHG | TEMPERATURE: 98.5 F | OXYGEN SATURATION: 94 % | BODY MASS INDEX: 24.11 KG/M2 | SYSTOLIC BLOOD PRESSURE: 91 MMHG | RESPIRATION RATE: 15 BRPM | WEIGHT: 150 LBS

## 2022-05-06 LAB — COLONOSCOPY: NORMAL

## 2022-05-06 PROCEDURE — G0500 MOD SEDAT ENDO SERVICE >5YRS: HCPCS | Performed by: INTERNAL MEDICINE

## 2022-05-06 PROCEDURE — 45378 DIAGNOSTIC COLONOSCOPY: CPT | Performed by: INTERNAL MEDICINE

## 2022-05-06 PROCEDURE — 250N000011 HC RX IP 250 OP 636: Performed by: INTERNAL MEDICINE

## 2022-05-06 RX ORDER — SIMETHICONE 40MG/0.6ML
133 SUSPENSION, DROPS(FINAL DOSAGE FORM)(ML) ORAL
Status: DISCONTINUED | OUTPATIENT
Start: 2022-05-06 | End: 2022-05-06 | Stop reason: HOSPADM

## 2022-05-06 RX ORDER — LIDOCAINE 40 MG/G
CREAM TOPICAL
Status: DISCONTINUED | OUTPATIENT
Start: 2022-05-06 | End: 2022-05-06 | Stop reason: HOSPADM

## 2022-05-06 RX ORDER — NALOXONE HYDROCHLORIDE 0.4 MG/ML
0.4 INJECTION, SOLUTION INTRAMUSCULAR; INTRAVENOUS; SUBCUTANEOUS
Status: DISCONTINUED | OUTPATIENT
Start: 2022-05-06 | End: 2022-05-06 | Stop reason: HOSPADM

## 2022-05-06 RX ORDER — ONDANSETRON 2 MG/ML
4 INJECTION INTRAMUSCULAR; INTRAVENOUS
Status: DISCONTINUED | OUTPATIENT
Start: 2022-05-06 | End: 2022-05-06 | Stop reason: HOSPADM

## 2022-05-06 RX ORDER — ATROPINE SULFATE 0.1 MG/ML
1 INJECTION INTRAVENOUS
Status: DISCONTINUED | OUTPATIENT
Start: 2022-05-06 | End: 2022-05-06 | Stop reason: HOSPADM

## 2022-05-06 RX ORDER — NALOXONE HYDROCHLORIDE 0.4 MG/ML
0.2 INJECTION, SOLUTION INTRAMUSCULAR; INTRAVENOUS; SUBCUTANEOUS
Status: DISCONTINUED | OUTPATIENT
Start: 2022-05-06 | End: 2022-05-06 | Stop reason: HOSPADM

## 2022-05-06 RX ORDER — FENTANYL CITRATE 0.05 MG/ML
50-100 INJECTION, SOLUTION INTRAMUSCULAR; INTRAVENOUS EVERY 5 MIN PRN
Status: DISCONTINUED | OUTPATIENT
Start: 2022-05-06 | End: 2022-05-06 | Stop reason: HOSPADM

## 2022-05-06 RX ORDER — FLUMAZENIL 0.1 MG/ML
0.2 INJECTION, SOLUTION INTRAVENOUS
Status: DISCONTINUED | OUTPATIENT
Start: 2022-05-06 | End: 2022-05-06 | Stop reason: HOSPADM

## 2022-05-06 RX ORDER — DIPHENHYDRAMINE HYDROCHLORIDE 50 MG/ML
25-50 INJECTION INTRAMUSCULAR; INTRAVENOUS
Status: DISCONTINUED | OUTPATIENT
Start: 2022-05-06 | End: 2022-05-06 | Stop reason: HOSPADM

## 2022-05-06 RX ORDER — ONDANSETRON 4 MG/1
4 TABLET, ORALLY DISINTEGRATING ORAL EVERY 6 HOURS PRN
Status: DISCONTINUED | OUTPATIENT
Start: 2022-05-06 | End: 2022-05-06 | Stop reason: HOSPADM

## 2022-05-06 RX ORDER — EPINEPHRINE 1 MG/ML
0.1 INJECTION, SOLUTION INTRAMUSCULAR; SUBCUTANEOUS
Status: DISCONTINUED | OUTPATIENT
Start: 2022-05-06 | End: 2022-05-06 | Stop reason: HOSPADM

## 2022-05-06 RX ORDER — ONDANSETRON 2 MG/ML
4 INJECTION INTRAMUSCULAR; INTRAVENOUS EVERY 6 HOURS PRN
Status: DISCONTINUED | OUTPATIENT
Start: 2022-05-06 | End: 2022-05-06 | Stop reason: HOSPADM

## 2022-05-06 RX ORDER — PROCHLORPERAZINE MALEATE 10 MG
10 TABLET ORAL EVERY 6 HOURS PRN
Status: DISCONTINUED | OUTPATIENT
Start: 2022-05-06 | End: 2022-05-06 | Stop reason: HOSPADM

## 2022-05-06 RX ADMIN — MIDAZOLAM 2 MG: 1 INJECTION INTRAMUSCULAR; INTRAVENOUS at 07:44

## 2022-05-06 RX ADMIN — MIDAZOLAM 1 MG: 1 INJECTION INTRAMUSCULAR; INTRAVENOUS at 07:51

## 2022-05-06 RX ADMIN — FENTANYL CITRATE 50 MCG: 0.05 INJECTION, SOLUTION INTRAMUSCULAR; INTRAVENOUS at 07:52

## 2022-05-06 RX ADMIN — FENTANYL CITRATE 100 MCG: 0.05 INJECTION, SOLUTION INTRAMUSCULAR; INTRAVENOUS at 07:47

## 2022-05-06 NOTE — H&P
Pre-Endoscopy History and Physical     Henrietta Conner MRN# [id number]   YOB: 1960 Age: 61 year old     Date of Procedure: 5/6/2022  Primary care provider: Tati Oakley  Type of Endoscopy: Colonoscopy with possible biopsy, possible polypectomy  Reason for Procedure: positive Cologuard  Type of Anesthesia Anticipated: Conscious Sedation    HPI:    Henrietta is a 61 year old female who will be undergoing the above procedure.      A history and physical has been performed. The patient's medications and allergies have been reviewed. The risks and benefits of the procedure and the sedation options and risks were discussed with the patient.  All questions were answered and informed consent was obtained.      She denies a personal or family history of anesthesia complications or bleeding disorders.     Patient Active Problem List   Diagnosis     Allergic state     Allergic rhinitis     Endometriosis of pelvic peritoneum     History of tobacco use     Lipidosis     Hyperlipidemia     HYPERLIPIDEMIA LDL GOAL <130     S/P hysterectomy with oophorectomy     Postmenopausal     2019 novel coronavirus disease (COVID-19)     Hypokalemia     Hypoxia     Elevated lactic acid level     Pneumonia due to 2019 novel coronavirus        Past Medical History:   Diagnosis Date     Endometriosis of pelvic peritoneum      Hyperlipidemia      Pneumonia due to 2019 novel coronavirus 09/2021    Only received 1 of 2 vaccination doses at time of diagnosis     Pulmonary embolism         Past Surgical History:   Procedure Laterality Date     HYSTERECTOMY, ELIAS  06/01/2006    ELIAS-BSO     PELVIS LAPAROSCOPY,DX  1994,1992    Laparoscopyx2 for endometriosis     REPAIR BLADDER  1967     [facility] REVISN JAW MUSCLE/BONE,EXTRAORAL  01/01/1976       Social History     Tobacco Use     Smoking status: Former Smoker     Packs/day: 1.00     Years: 30.00     Pack years: 30.00     Types: Cigarettes     Smokeless tobacco: Never Used   Substance  "Use Topics     Alcohol use: No       Family History   Problem Relation Age of Onset     Cerebrovascular Disease Mother         in early 70's     Arthritis Mother      Hypertension Mother      Diabetes Mother      Lipids Mother      Diabetes Father      Hypertension Father      Arthritis Father      Obesity Brother         x2     C.A.D. Maternal Grandfather          80's       Prior to Admission medications    Medication Sig Start Date End Date Taking? Authorizing Provider   albuterol (PROAIR HFA/PROVENTIL HFA/VENTOLIN HFA) 108 (90 Base) MCG/ACT inhaler Inhale 1-2 puffs into the lungs every 4 hours as needed for shortness of breath / dyspnea or wheezing 21   Tati Oakley PA-C   Aspirin-Acetaminophen-Caffeine (EXCEDRIN PO) Take by mouth as needed    Reported, Patient   calcium carbonate-vitamin D 600-400 MG-UNIT CHEW Take 1 chew tab by mouth 2 times daily    Reported, Patient   diphenhydrAMINE (BENADRYL) 25 MG tablet Take 25-50 mg by mouth every 6 hours as needed for itching or allergies    Unknown, Entered By History   FISH OIL 1000 MG OR CAPS 2 capsules daily    Reported, Patient   GLUCOSAMINE OR 2 tablets daily     Reported, Patient   MAGNESIUM OXIDE PO Take 500 mg by mouth daily     Reported, Patient   MULTI-VITAMIN OR TABS 1 tablet daily    Reported, Patient   TURMERIC PO     Reported, Patient       Allergies   Allergen Reactions     Omnicef [Cefdinir] GI Disturbance     And vaginal yeast infection        REVIEW OF SYSTEMS:   5 point ROS negative except as noted above in HPI, including Gen., Resp., CV, GI &  system review.    PHYSICAL EXAM:   [facility] 2006  Estimated body mass index is 24.36 kg/m  as calculated from the following:    Height as of 22: 1.689 m (5' 6.5\").    Weight as of 22: 69.5 kg (153 lb 3.2 oz).   GENERAL APPEARANCE: alert, and oriented  MENTAL STATUS: alert  AIRWAY EXAM: Mallampatti Class I (visualization of the soft palate, fauces, uvula, anterior and posterior " pillars)  RESP: lungs clear to auscultation - no rales, rhonchi or wheezes  CV: regular rates and rhythm  DIAGNOSTICS:    Not indicated    IMPRESSION   ASA Class 2 - Mild systemic disease    PLAN:   Plan for Colonoscopy with possible biopsy, possible polypectomy. We discussed the risks, benefits and alternatives and the patient wished to proceed.    The above has been forwarded to the consulting provider.      Signed Electronically by: Adin Nunez MD  May 6, 2022

## 2022-05-06 NOTE — DISCHARGE INSTRUCTIONS
The patient has received a copy of the Provation  report the doctor has written and discharge instructions have been discussed with the patient and responsible adult.  All questions were addressed and answered prior to patient discharge.

## 2022-05-06 NOTE — LETTER
April 21, 2022      Henrietta Conner  [address]  [facility] [address]        Dear Henrietta,           Please be aware that coverage of these services is subject to the terms and limitations of your health insurance plan.  Call member services at your health plan with any benefit or coverage questions.    Thank you for choosing [facility]. You are scheduled for the following service(s):    Date:  5/6/22             Procedure:  COLONOSCOPY  Doctor:        Adin Norman   Arrival Time:  07:00 AM   Enter and check in at the Main Hospital Entrance  Procedure Time:  07:30 AM      Location:   [facility]        Endoscopy Department, First Floor         [address] [place], [place] [id number] [phone] or [phone] ([facility]) to reschedule      MIRALAX -GATORADE  PREP  Colonoscopy is the most accurate test to detect colon polyps and colon cancer; and the only test where polyps can be removed. During this procedure, a doctor examines the lining of your large intestine and rectum through a flexible tube.   Transportation  You must arrange for a ride for the day of your procedure with a responsible adult. A taxi , Uber, etc, is not an option unless you are accompanied by a responsible adult. If you fail to arrange transportation with a responsible adult, your procedure will be cancelled and rescheduled.    Purchase the  following supplies at your local pharmacy:  - 2 (two) bisacodyl tablets: each tablet contains 5 mg.  (Dulcolax  laxative NOT Dulcolax  stool softener)   - 1 (one) 8.3 oz bottle of Polyethylene Glycol (PEG) 3350 Powder   (MiraLAX , Smooth LAX , ClearLAX  or equivalent)  - 64 oz Gatorade    Regular Gatorade, Gatorade G2 , Powerade , Powerade Zero  or Pedialyte  is acceptable. Red colored flavors are not allowed; all other colors (yellow, green, orange, purple and blue) are okay. It is also okay to buy two 2.12 oz packets of  powdered Gatorade that can be mixed with water to a total volume of 64 oz of liquid.  - 1 (one) 10 oz bottle of Magnesium Citrate (Red colored flavors are not allowed)  It is also okay for you to use a 0.5 oz package of powdered magnesium citrate (17 g) mixed with 10 oz of water.      PREPARATION FOR COLONOSCOPY    7 days before:    Discontinue fiber supplements and medications containing iron. This includes Metamucil  and Fibercon ; and multivitamins with iron.  3 days before:    Begin a low-fiber diet. A low-fiber diet helps making the cleanout more effective.     Examples of a low-fiber diet include (but are not limited to): white bread, white rice, pasta, crackers, fish, chicken, eggs, ground beef, creamy peanut butter, cooked/steamed/boiled vegetables, canned fruit, bananas, melons, milk, plain yogurt cheese, salad dressing and other condiments.     The following are not allowed on a low-fiber diet: seeds, nuts, popcorn, bran, whole wheat, corn, quinoa, raw fruits and vegetables, berries and dried fruit, beans and lentils.    For additional details on low-fiber diet, please refer to the table on the last page.    2 days before:    Continue the low-fiber diet.     Drink at least 8 glasses of water throughout the day.     Stop eating solid foods at 11:45 pm.    1 day before:    In the morning: begin a clear liquid diet (liquids you can see through).     Examples of a clear liquid diet include: water, clear broth or bouillon, Gatorade, Pedialyte or Powerade, carbonated and non-carbonated soft drinks (Sprite , 7-Up , ginger ale), strained fruit juices without pulp (apple, white grape, white cranberry), Jell-O  and popsicles.     The following are not allowed on a clear liquid diet: red liquids, alcoholic beverages, dairy products (milk, creamer, and yogurt), protein shakes, creamy broths, juice with pulp and chewing tobacco.    At noon: take 2 (two) bisacodyl tablets     At 4 (and no later than 6pm): start drinking  the Miralax-Gatorade preparation (8.3 oz of Miralax mixed with 64 oz of Gatorade in a large pitcher). Drink 1(one) 8 oz glass every 15 minutes thereafter, until the mixture is gone.  COLON CLEANSING TIPS: drink adequate amounts of fluids before and after your colon cleansing to prevent dehydration. Stay near a toilet because you will have diarrhea. Even if you are sitting on the toilet, continue to drink the cleansing solution every 15 minutes. If you feel nauseous or vomit, rinse your mouth with water, take a 15 to 30-minute-break and then continue drinking the solution. You will be uncomfortable until the stool has flushed from your colon (in about 2 to 4 hours). You may feel chilled.    Day of your procedure  You may take your morning medications including blood pressure medications, methadone, anti-seizure medications with sips of water 3 hours prior to your procedure or earlier. Do not take insulin, blood thinners (unless specifically told by your primary care provider) or vitamins prior to your procedure. Continue the clear liquid diet.     4 hours prior: drink 10 oz of magnesium citrate. It may be easier to drink it with a straw.    STOP consuming all liquids after that.     Do not take anything by mouth during this time.     Allow extra time to travel to your procedure as you may need to stop and use a restroom along the way.    You are ready for the procedure, if you followed all instructions and your stool is no longer formed, but clear or yellow liquid. If you are unsure whether your colon is clean, please call our office at [phone] before you leave for your appointment.    Bring the following to your procedure:  - Insurance Card/Photo ID.   - List of current medications including over-the-counter medications and supplements.   - Your rescue inhaler if you currently use one to control asthma.    Canceling or rescheduling your appointment:   If you must cancel or reschedule your appointment, please  call [phone] as soon as possible.  COLONOSCOPY PRE-PROCEDURE CHECKLIST    If you have diabetes, ask your regular doctor for diet and medication restrictions.  If you take an anticoagulant or anti-platelet medication (such as Coumadin , Lovenox , Pradaxa , Xarelto , Eliquis , etc.), please call your primary doctor for advice on holding this medication.  If you take aspirin you may continue to do so.  If you are or may be pregnant, please discuss the risks and benefits of this procedure with your doctor.      What happens during a colonoscopy?    Plan to spend up to two hours, starting at registration time, at the endoscopy center the day of your procedure. The colonoscopy takes an average of 15 to 30 minutes. Recovery time is about 30 minutes.      Before the exam:    You will change into a gown.    Your medical history and medication list will be reviewed with you, unless that has been done over the phone prior to the procedure.     A nurse will insert an intravenous (IV) line into your hand or arm.    The doctor will meet with you and will give you a consent form to sign.  During the exam:     Medicine will be given through the IV line to help you relax.     Your heart rate and oxygen levels will be monitored. If your blood pressure is low, you may be given fluids through the IV line.     The doctor will insert a flexible hollow tube, called a colonoscope, into your rectum. The scope will be advanced slowly through the large intestine (colon).    You may have a feeling of fullness or pressure.     If an abnormal tissue or a polyp is found, the doctor may remove it through the endoscope for closer examination, or biopsy. Tissue removal is painless    After the exam:           Any tissue samples removed during the exam will be sent to a lab for evaluation. It may take 5-7 working days for you to be notified of the results.     A nurse will provide you with complete discharge instructions before you leave the  endoscopy center. Be sure to ask the nurse for specific instructions if you take blood thinners such as Aspirin, Coumadin or Plavix.     The doctor will prepare a full report for you and for the physician who referred you for the procedure.     Your doctor will talk with you about the initial results of your exam.      Medication given during the exam will prohibit you from driving for the rest of the day.     Following the exam, you may resume your normal diet. Your first meal should be light, no greasy foods. Avoid alcohol until the next day.     You may resume your regular activities the day after the procedure.         LOW-FIBER DIET    Foods RECOMMENDED Foods to AVOID   Breads, Cereal, Rice and Pasta:   White bread, rolls, biscuits, croissant and claudy toast.   Waffles, [place]ambian toast and pancakes.   White rice, noodles, pasta, macaroni and peeled cooked potatoes.   Plain crackers and saltines.   Cooked cereals: farina, cream of rice.   Cold cereals: Puffed Rice , Rice Krispies , Corn Flakes  and Special K    Breads, Cereal, Rice and Pasta:   Breads or rolls with nuts, seeds or fruit.   Whole wheat, pumpernickel, rye breads and cornbread.   Potatoes with skin, brown or wild rice, and kasha (buckwheat).     Vegetables:   Tender cooked and canned vegetables without seeds: carrots, asparagus tips, green or wax beans, pumpkin, spinach, lima beans. Vegetables:   Raw or steamed vegetables.   Vegetables with seeds.   Sauerkraut.   Winter squash, peas, broccoli, Brussel sprouts, cabbage, onions, cauliflower, baked beans, peas and corn.   Fruits:   Strained fruit juice.   Canned fruit, except pineapple.   Ripe bananas and melon. Fruits:   Prunes and prune juice.   Raw fruits.   Dried fruits: figs, dates and raisins.   Milk/Dairy:   Milk: plain or flavored.   Yogurt, custard and ice cream.   Cheese and cottage cheese Milk/Dairy:     Meat and other proteins:   ground, well-cooked tender beef, lamb, ham, veal, pork, fish,  poultry and organ meats.   Eggs.   Peanut butter without nuts. Meat and other proteins:   Tough, fibrous meats with gristle.   Dry beans, peas and lentils.   Peanut butter with nuts.   Tofu.   Fats, Snack, Sweets, Condiments and Beverages:   Margarine, butter, oils, mayonnaise, sour cream and salad dressing, plain gravy.   Sugar, hard candy, clear jelly, honey and syrup.   Spices, cooked herbs, bouillon, broth and soups made with allowed vegetable, ketchup and mustard.   Coffee, tea and carbonated drinks.   Plain cakes, cookies and pretzels.   Gelatin, plain puddings, custard, ice cream, sherbet and popsicles. Fats, Snack, Sweets, Condiments and Beverages:   Nuts, seeds and coconut.   Jam, marmalade and preserves.   Pickles, olives, relish and horseradish.   All desserts containing nuts, seeds, dried fruit and coconut; or made from whole grains or bran.   Candy made with nuts or seeds.   Popcorn.     DIRECTIONS TO THE ENDOSCOPY DEPARTMENT    From the north ([facility])  Take [address] South, exit on [address] [address]. Get into the left hand jose j, turn left (east), go one-half mile to [address] and turn left. Go north to the second stoplight, take a right on [address] and follow it to the Main Hospital entrance.    From the south ([facility])  Take 35N to the [address] split and exit on [address] [address]. On [address] [address], turn left (west) to [address]. Turn right (north) on [address]. Go north to the second stoplight, take a right on [address] and follow it to the Main Hospital entrance.    From the east via 35E ([facility])  Take [address]E south to [address] [address] exit. Turn right on [address] [address]. Go west to [address]. Turn right (north) on [address]. Go to the second stoplight, take a right on [address] to the Main Hospital entrance.    From the east via [address] ([facility])  Take [address] to [address]. Turn left  (south) on [address] to [address], turn left (east) on [address] and follow it to the Main Hospital entrance.    From the west via [address] ([place])  Take [address] east to [address]. Turn right (south) on [address] to [address], turn left (east) on [address] and follow it to the Main Hospital entrance.

## 2022-10-15 ENCOUNTER — HEALTH MAINTENANCE LETTER (OUTPATIENT)
Age: 62
End: 2022-10-15

## 2023-03-26 ENCOUNTER — HEALTH MAINTENANCE LETTER (OUTPATIENT)
Age: 63
End: 2023-03-26

## 2023-05-23 SDOH — ECONOMIC STABILITY: FOOD INSECURITY: WITHIN THE PAST 12 MONTHS, THE FOOD YOU BOUGHT JUST DIDN'T LAST AND YOU DIDN'T HAVE MONEY TO GET MORE.: NEVER TRUE

## 2023-05-23 SDOH — HEALTH STABILITY: PHYSICAL HEALTH: ON AVERAGE, HOW MANY DAYS PER WEEK DO YOU ENGAGE IN MODERATE TO STRENUOUS EXERCISE (LIKE A BRISK WALK)?: 6 DAYS

## 2023-05-23 SDOH — ECONOMIC STABILITY: INCOME INSECURITY: HOW HARD IS IT FOR YOU TO PAY FOR THE VERY BASICS LIKE FOOD, HOUSING, MEDICAL CARE, AND HEATING?: NOT VERY HARD

## 2023-05-23 SDOH — HEALTH STABILITY: PHYSICAL HEALTH: ON AVERAGE, HOW MANY MINUTES DO YOU ENGAGE IN EXERCISE AT THIS LEVEL?: 40 MIN

## 2023-05-23 SDOH — ECONOMIC STABILITY: INCOME INSECURITY: IN THE LAST 12 MONTHS, WAS THERE A TIME WHEN YOU WERE NOT ABLE TO PAY THE MORTGAGE OR RENT ON TIME?: NO

## 2023-05-23 SDOH — ECONOMIC STABILITY: FOOD INSECURITY: WITHIN THE PAST 12 MONTHS, YOU WORRIED THAT YOUR FOOD WOULD RUN OUT BEFORE YOU GOT MONEY TO BUY MORE.: NEVER TRUE

## 2023-05-23 ASSESSMENT — ENCOUNTER SYMPTOMS
DYSURIA: 0
COUGH: 0
NAUSEA: 0
CHILLS: 0
PARESTHESIAS: 0
FREQUENCY: 0
FEVER: 0
DIARRHEA: 0
NERVOUS/ANXIOUS: 0
DIZZINESS: 0
CONSTIPATION: 0
PALPITATIONS: 0
BREAST MASS: 0
HEADACHES: 0
SHORTNESS OF BREATH: 0
JOINT SWELLING: 0
MYALGIAS: 1
ABDOMINAL PAIN: 0
HEARTBURN: 0
HEMATOCHEZIA: 0
HEMATURIA: 0
EYE PAIN: 0
ARTHRALGIAS: 0
WEAKNESS: 0
SORE THROAT: 0

## 2023-05-23 ASSESSMENT — SOCIAL DETERMINANTS OF HEALTH (SDOH)
HOW OFTEN DO YOU ATTEND CHURCH OR RELIGIOUS SERVICES?: 1 TO 4 TIMES PER YEAR
IN A TYPICAL WEEK, HOW MANY TIMES DO YOU TALK ON THE PHONE WITH FAMILY, FRIENDS, OR NEIGHBORS?: NEVER
DO YOU BELONG TO ANY CLUBS OR ORGANIZATIONS SUCH AS CHURCH GROUPS UNIONS, FRATERNAL OR ATHLETIC GROUPS, OR SCHOOL GROUPS?: YES
HOW OFTEN DO YOU GET TOGETHER WITH FRIENDS OR RELATIVES?: TWICE A WEEK

## 2023-05-23 ASSESSMENT — LIFESTYLE VARIABLES
SKIP TO QUESTIONS 9-10: 1
AUDIT-C TOTAL SCORE: 0
HOW MANY STANDARD DRINKS CONTAINING ALCOHOL DO YOU HAVE ON A TYPICAL DAY: PATIENT DOES NOT DRINK
HOW OFTEN DO YOU HAVE SIX OR MORE DRINKS ON ONE OCCASION: NEVER
HOW OFTEN DO YOU HAVE A DRINK CONTAINING ALCOHOL: NEVER

## 2023-05-30 ENCOUNTER — OFFICE VISIT (OUTPATIENT)
Dept: FAMILY MEDICINE | Facility: CLINIC | Age: 63
End: 2023-05-30
Payer: COMMERCIAL

## 2023-05-30 VITALS
BODY MASS INDEX: 23.4 KG/M2 | RESPIRATION RATE: 16 BRPM | SYSTOLIC BLOOD PRESSURE: 123 MMHG | TEMPERATURE: 97.9 F | HEART RATE: 82 BPM | OXYGEN SATURATION: 98 % | HEIGHT: 66 IN | WEIGHT: 145.6 LBS | DIASTOLIC BLOOD PRESSURE: 78 MMHG

## 2023-05-30 DIAGNOSIS — Z12.31 VISIT FOR SCREENING MAMMOGRAM: ICD-10-CM

## 2023-05-30 DIAGNOSIS — Z00.00 ROUTINE HISTORY AND PHYSICAL EXAMINATION OF ADULT: Primary | ICD-10-CM

## 2023-05-30 LAB
ALBUMIN SERPL BCG-MCNC: 4.6 G/DL (ref 3.5–5.2)
ALP SERPL-CCNC: 77 U/L (ref 35–104)
ALT SERPL W P-5'-P-CCNC: 17 U/L (ref 10–35)
ANION GAP SERPL CALCULATED.3IONS-SCNC: 11 MMOL/L (ref 7–15)
AST SERPL W P-5'-P-CCNC: 20 U/L (ref 10–35)
BILIRUB SERPL-MCNC: 0.3 MG/DL
BUN SERPL-MCNC: 20.4 MG/DL (ref 8–23)
CALCIUM SERPL-MCNC: 10.3 MG/DL (ref 8.8–10.2)
CHLORIDE SERPL-SCNC: 104 MMOL/L (ref 98–107)
CHOLEST SERPL-MCNC: 218 MG/DL
CREAT SERPL-MCNC: 0.75 MG/DL (ref 0.51–0.95)
DEPRECATED HCO3 PLAS-SCNC: 25 MMOL/L (ref 22–29)
ERYTHROCYTE [DISTWIDTH] IN BLOOD BY AUTOMATED COUNT: 11.7 % (ref 10–15)
GFR SERPL CREATININE-BSD FRML MDRD: 90 ML/MIN/1.73M2
GLUCOSE SERPL-MCNC: 96 MG/DL (ref 70–99)
HCT VFR BLD AUTO: 39.3 % (ref 35–47)
HDLC SERPL-MCNC: 65 MG/DL
HGB BLD-MCNC: 13.3 G/DL (ref 11.7–15.7)
LDLC SERPL CALC-MCNC: 144 MG/DL
MCH RBC QN AUTO: 30.6 PG (ref 26.5–33)
MCHC RBC AUTO-ENTMCNC: 33.8 G/DL (ref 31.5–36.5)
MCV RBC AUTO: 91 FL (ref 78–100)
NONHDLC SERPL-MCNC: 153 MG/DL
PLATELET # BLD AUTO: 241 10E3/UL (ref 150–450)
POTASSIUM SERPL-SCNC: 3.9 MMOL/L (ref 3.4–5.3)
PROT SERPL-MCNC: 6.3 G/DL (ref 6.4–8.3)
RBC # BLD AUTO: 4.34 10E6/UL (ref 3.8–5.2)
SODIUM SERPL-SCNC: 140 MMOL/L (ref 136–145)
TRIGL SERPL-MCNC: 47 MG/DL
WBC # BLD AUTO: 3.5 10E3/UL (ref 4–11)

## 2023-05-30 PROCEDURE — 80053 COMPREHEN METABOLIC PANEL: CPT | Performed by: PHYSICIAN ASSISTANT

## 2023-05-30 PROCEDURE — 80061 LIPID PANEL: CPT | Performed by: PHYSICIAN ASSISTANT

## 2023-05-30 PROCEDURE — 99396 PREV VISIT EST AGE 40-64: CPT | Mod: 25 | Performed by: PHYSICIAN ASSISTANT

## 2023-05-30 PROCEDURE — 90750 HZV VACC RECOMBINANT IM: CPT | Performed by: PHYSICIAN ASSISTANT

## 2023-05-30 PROCEDURE — 85027 COMPLETE CBC AUTOMATED: CPT | Performed by: PHYSICIAN ASSISTANT

## 2023-05-30 PROCEDURE — 90471 IMMUNIZATION ADMIN: CPT | Performed by: PHYSICIAN ASSISTANT

## 2023-05-30 PROCEDURE — 36415 COLL VENOUS BLD VENIPUNCTURE: CPT | Performed by: PHYSICIAN ASSISTANT

## 2023-05-30 ASSESSMENT — ENCOUNTER SYMPTOMS
ABDOMINAL PAIN: 0
CONSTIPATION: 0
PALPITATIONS: 0
DIZZINESS: 0
MYALGIAS: 1
NERVOUS/ANXIOUS: 0
SORE THROAT: 0
EYE PAIN: 0
NAUSEA: 0
HEMATURIA: 0
WEAKNESS: 0
FREQUENCY: 0
COUGH: 0
DYSURIA: 0
SHORTNESS OF BREATH: 0
HEADACHES: 0
FEVER: 0
HEMATOCHEZIA: 0
DIARRHEA: 0
CHILLS: 0
HEARTBURN: 0
BREAST MASS: 0
ARTHRALGIAS: 0
JOINT SWELLING: 0
PARESTHESIAS: 0

## 2023-05-30 ASSESSMENT — PAIN SCALES - GENERAL: PAINLEVEL: NO PAIN (0)

## 2023-05-30 NOTE — PROGRESS NOTES
SUBJECTIVE:   CC: Karen is an 62 year old who presents for preventive health visit.       2023     9:15 AM   Additional Questions   Roomed by Bety Perla   Patient has been advised of split billing requirements and indicates understanding: Yes  Healthy Habits:     Getting at least 3 servings of Calcium per day:  Yes    Bi-annual eye exam:  Yes    Dental care twice a year:  Yes    Sleep apnea or symptoms of sleep apnea:  None    Diet:  Carbohydrate counting    Frequency of exercise:  4-5 days/week    Duration of exercise:  15-30 minutes    Taking medications regularly:  Yes    Medication side effects:  Other    PHQ-2 Total Score: 0    Additional concerns today:  No        Today's PHQ-2 Score:       2023     9:11 AM   PHQ-2 (  [employer])   Q1: Little interest or pleasure in doing things 0   Q2: Feeling down, depressed or hopeless 0   PHQ-2 Score 0   Q1: Little interest or pleasure in doing things Not at all   Q2: Feeling down, depressed or hopeless Not at all   PHQ-2 Score 0           Social History     Tobacco Use     Smoking status: Former     Packs/day: 1.00     Years: 30.00     Pack years: 30.00     Types: Cigarettes     Smokeless tobacco: Never   Vaping Use     Vaping status: Never Used   Substance Use Topics     Alcohol use: No             2023     2:18 PM   Alcohol Use   Prescreen: >3 drinks/day or >7 drinks/week? Not Applicable     Reviewed orders with patient.  Reviewed health maintenance and updated orders accordingly - Yes  Lab work is in process    Breast Cancer Screenin/16/2022     6:49 PM   Breast CA Risk Assessment (FHS-7)   Do you have a family history of breast, colon, or ovarian cancer? No / Unknown         Mammogram Screening: Recommended mammography every 1-2 years with patient discussion and risk factor consideration  Pertinent mammograms are reviewed under the imaging tab.    History of abnormal Pap smear: NO - age 30-65 PAP every 5 years with negative HPV  "co-testing recommended      10/8/2010    12:00 AM 9/1/2009    12:00 AM 8/4/2008    12:00 AM   PAP / HPV   PAP (Historical) NIL   NIL   NIL       Reviewed and updated as needed this visit by clinical staff   Tobacco  Allergies  Meds              Reviewed and updated as needed this visit by Provider                 Past Medical History:   Diagnosis Date     Endometriosis of pelvic peritoneum      Hyperlipidemia      Pneumonia due to 2019 novel coronavirus 09/2021    Only received 1 of 2 vaccination doses at time of diagnosis     Pulmonary embolism         Review of Systems   Constitutional: Negative for chills and fever.   HENT: Negative for congestion, ear pain, hearing loss and sore throat.    Eyes: Negative for pain and visual disturbance.   Respiratory: Negative for cough and shortness of breath.    Cardiovascular: Positive for peripheral edema. Negative for chest pain and palpitations.   Gastrointestinal: Negative for abdominal pain, constipation, diarrhea, heartburn, hematochezia and nausea.   Breasts:  Negative for tenderness, breast mass and discharge.   Genitourinary: Negative for dysuria, frequency, genital sores, hematuria, pelvic pain, urgency, vaginal bleeding and vaginal discharge.   Musculoskeletal: Positive for myalgias. Negative for arthralgias and joint swelling.   Skin: Negative for rash.   Neurological: Negative for dizziness, weakness, headaches and paresthesias.   Psychiatric/Behavioral: Negative for mood changes. The patient is not nervous/anxious.           OBJECTIVE:   /78 (BP Location: Right arm, Patient Position: Chair, Cuff Size: Adult Regular)   Pulse 82   Temp 97.9  F (36.6  C) (Oral)   Resp 16   Ht 1.676 m (5' 6\")   Wt 66 kg (145 lb 9.6 oz)   LMP 06/01/2006   SpO2 98%   BMI 23.50 kg/m    Physical Exam  GENERAL APPEARANCE: healthy, alert and no distress  EYES: Eyes grossly normal to inspection, PERRL and conjunctivae and sclerae normal  HENT: ear canals and TM's normal, " nose and mouth without ulcers or lesions, oropharynx clear and oral mucous membranes moist  NECK: no adenopathy, no asymmetry, masses, or scars and thyroid normal to palpation  RESP: lungs clear to auscultation - no rales, rhonchi or wheezes  CV: regular rate and rhythm, normal S1 S2, no S3 or S4, no murmur, click or rub, no peripheral edema and peripheral pulses strong  ABDOMEN: soft, nontender, no hepatosplenomegaly, no masses and bowel sounds normal  MS: no musculoskeletal defects are noted and gait is age appropriate without ataxia  SKIN: no suspicious lesions or rashes  NEURO: Normal strength and tone, sensory exam grossly normal, mentation intact and speech normal  PSYCH: mentation appears normal and affect normal/bright        ASSESSMENT/PLAN:   (Z00.00) Routine history and physical examination of adult  (primary encounter diagnosis)  Comment:   Plan: CBC with platelets, Comprehensive metabolic         panel (BMP + Alb, Alk Phos, ALT, AST, Total.         Bili, TP), Lipid panel reflex to direct LDL         Fasting            (Z12.31) Visit for screening mammogram  Comment:   Plan: MA SCREENING DIGITAL BILAT - Future  (s+30)              Patient has been advised of split billing requirements and indicates understanding: Yes      COUNSELING:  Reviewed preventive health counseling, as reflected in patient instructions       Regular exercise       Healthy diet/nutrition       Vision screening       Immunizations    Vaccinated for: Zoster              She reports that she has quit smoking. Her smoking use included cigarettes. She has a 30.00 pack-year smoking history. She has never used smokeless tobacco.          NICOLE Pyle [facility]

## 2023-06-01 ENCOUNTER — HEALTH MAINTENANCE LETTER (OUTPATIENT)
Age: 63
End: 2023-06-01

## 2023-06-22 ENCOUNTER — ANCILLARY PROCEDURE (OUTPATIENT)
Dept: MAMMOGRAPHY | Facility: CLINIC | Age: 63
End: 2023-06-22
Attending: PHYSICIAN ASSISTANT
Payer: COMMERCIAL

## 2023-06-22 DIAGNOSIS — Z12.31 VISIT FOR SCREENING MAMMOGRAM: ICD-10-CM

## 2023-06-22 PROCEDURE — 77067 SCR MAMMO BI INCL CAD: CPT | Mod: TC | Performed by: RADIOLOGY

## 2024-04-30 ENCOUNTER — PATIENT OUTREACH (OUTPATIENT)
Dept: CARE COORDINATION | Facility: CLINIC | Age: 64
End: 2024-04-30
Payer: COMMERCIAL

## 2024-05-14 ENCOUNTER — PATIENT OUTREACH (OUTPATIENT)
Dept: CARE COORDINATION | Facility: CLINIC | Age: 64
End: 2024-05-14
Payer: COMMERCIAL

## 2024-05-23 ENCOUNTER — PATIENT OUTREACH (OUTPATIENT)
Dept: CARE COORDINATION | Facility: CLINIC | Age: 64
End: 2024-05-23
Payer: COMMERCIAL

## 2024-06-20 ENCOUNTER — PATIENT OUTREACH (OUTPATIENT)
Dept: CARE COORDINATION | Facility: CLINIC | Age: 64
End: 2024-06-20
Payer: COMMERCIAL

## 2024-08-04 ENCOUNTER — HEALTH MAINTENANCE LETTER (OUTPATIENT)
Age: 64
End: 2024-08-04

## 2024-08-08 ENCOUNTER — ANCILLARY PROCEDURE (OUTPATIENT)
Dept: MAMMOGRAPHY | Facility: CLINIC | Age: 64
End: 2024-08-08
Payer: COMMERCIAL

## 2024-08-08 DIAGNOSIS — Z12.31 VISIT FOR SCREENING MAMMOGRAM: ICD-10-CM

## 2024-08-08 PROCEDURE — 77063 BREAST TOMOSYNTHESIS BI: CPT | Mod: TC | Performed by: RADIOLOGY

## 2024-08-08 PROCEDURE — 77067 SCR MAMMO BI INCL CAD: CPT | Mod: TC | Performed by: RADIOLOGY

## 2024-08-12 SDOH — HEALTH STABILITY: PHYSICAL HEALTH: ON AVERAGE, HOW MANY MINUTES DO YOU ENGAGE IN EXERCISE AT THIS LEVEL?: 20 MIN

## 2024-08-12 SDOH — HEALTH STABILITY: PHYSICAL HEALTH: ON AVERAGE, HOW MANY DAYS PER WEEK DO YOU ENGAGE IN MODERATE TO STRENUOUS EXERCISE (LIKE A BRISK WALK)?: 3 DAYS

## 2024-08-12 ASSESSMENT — SOCIAL DETERMINANTS OF HEALTH (SDOH): HOW OFTEN DO YOU GET TOGETHER WITH FRIENDS OR RELATIVES?: ONCE A WEEK

## 2024-08-13 ENCOUNTER — DOCUMENTATION ONLY (OUTPATIENT)
Dept: FAMILY MEDICINE | Facility: CLINIC | Age: 64
End: 2024-08-13
Payer: COMMERCIAL

## 2024-08-13 DIAGNOSIS — Z00.00 ROUTINE HISTORY AND PHYSICAL EXAMINATION OF ADULT: Primary | ICD-10-CM

## 2024-08-13 NOTE — PROGRESS NOTES
Henrietta Hopkinsmarko has an upcoming lab appointment:    Future Appointments   Date Time Provider Department Center   8/15/2024  9:45 AM CR LAB CRLABR CR   8/16/2024  1:00 PM Tati Oakley PA-C [facility] CR     Patient appt state, labs for upcoming physical. There is no order available. Please review and place either future orders or HMPO (Review of Health Maintenance Protocol Orders), as appropriate.    Thank You,    Angie Spain  DANILO III  [facility]  P: [phone]

## 2024-08-15 ENCOUNTER — LAB (OUTPATIENT)
Dept: LAB | Facility: CLINIC | Age: 64
End: 2024-08-15
Payer: COMMERCIAL

## 2024-08-15 DIAGNOSIS — Z00.00 ROUTINE HISTORY AND PHYSICAL EXAMINATION OF ADULT: ICD-10-CM

## 2024-08-15 LAB
ALBUMIN SERPL BCG-MCNC: 4.5 G/DL (ref 3.5–5.2)
ALP SERPL-CCNC: 59 U/L (ref 40–150)
ALT SERPL W P-5'-P-CCNC: 17 U/L (ref 0–50)
ANION GAP SERPL CALCULATED.3IONS-SCNC: 9 MMOL/L (ref 7–15)
AST SERPL W P-5'-P-CCNC: 17 U/L (ref 0–45)
BILIRUB SERPL-MCNC: 0.3 MG/DL
BUN SERPL-MCNC: 13.7 MG/DL (ref 8–23)
CALCIUM SERPL-MCNC: 9.6 MG/DL (ref 8.8–10.4)
CHLORIDE SERPL-SCNC: 107 MMOL/L (ref 98–107)
CHOLEST SERPL-MCNC: 217 MG/DL
CREAT SERPL-MCNC: 0.69 MG/DL (ref 0.51–0.95)
EGFRCR SERPLBLD CKD-EPI 2021: >90 ML/MIN/1.73M2
ERYTHROCYTE [DISTWIDTH] IN BLOOD BY AUTOMATED COUNT: 12.1 % (ref 10–15)
FASTING STATUS PATIENT QL REPORTED: YES
FASTING STATUS PATIENT QL REPORTED: YES
GLUCOSE SERPL-MCNC: 96 MG/DL (ref 70–99)
HCO3 SERPL-SCNC: 25 MMOL/L (ref 22–29)
HCT VFR BLD AUTO: 40.5 % (ref 35–47)
HDLC SERPL-MCNC: 61 MG/DL
HGB BLD-MCNC: 13.5 G/DL (ref 11.7–15.7)
LDLC SERPL CALC-MCNC: 142 MG/DL
MCH RBC QN AUTO: 29.9 PG (ref 26.5–33)
MCHC RBC AUTO-ENTMCNC: 33.3 G/DL (ref 31.5–36.5)
MCV RBC AUTO: 90 FL (ref 78–100)
NONHDLC SERPL-MCNC: 156 MG/DL
PLATELET # BLD AUTO: 242 10E3/UL (ref 150–450)
POTASSIUM SERPL-SCNC: 3.9 MMOL/L (ref 3.4–5.3)
PROT SERPL-MCNC: 6.5 G/DL (ref 6.4–8.3)
RBC # BLD AUTO: 4.51 10E6/UL (ref 3.8–5.2)
SODIUM SERPL-SCNC: 141 MMOL/L (ref 135–145)
TRIGL SERPL-MCNC: 71 MG/DL
WBC # BLD AUTO: 4.5 10E3/UL (ref 4–11)

## 2024-08-15 PROCEDURE — 85027 COMPLETE CBC AUTOMATED: CPT

## 2024-08-15 PROCEDURE — 80061 LIPID PANEL: CPT

## 2024-08-15 PROCEDURE — 36415 COLL VENOUS BLD VENIPUNCTURE: CPT

## 2024-08-15 PROCEDURE — 80053 COMPREHEN METABOLIC PANEL: CPT

## 2024-08-16 ENCOUNTER — OFFICE VISIT (OUTPATIENT)
Dept: FAMILY MEDICINE | Facility: CLINIC | Age: 64
End: 2024-08-16
Payer: COMMERCIAL

## 2024-08-16 VITALS
OXYGEN SATURATION: 98 % | SYSTOLIC BLOOD PRESSURE: 130 MMHG | HEIGHT: 67 IN | RESPIRATION RATE: 16 BRPM | TEMPERATURE: 98 F | BODY MASS INDEX: 23.73 KG/M2 | WEIGHT: 151.2 LBS | DIASTOLIC BLOOD PRESSURE: 82 MMHG | HEART RATE: 69 BPM

## 2024-08-16 DIAGNOSIS — Z86.711 HISTORY OF PULMONARY EMBOLISM: ICD-10-CM

## 2024-08-16 DIAGNOSIS — Z00.00 ROUTINE GENERAL MEDICAL EXAMINATION AT A HEALTH CARE FACILITY: Primary | ICD-10-CM

## 2024-08-16 PROCEDURE — 99396 PREV VISIT EST AGE 40-64: CPT | Performed by: PHYSICIAN ASSISTANT

## 2024-08-16 ASSESSMENT — PAIN SCALES - GENERAL: PAINLEVEL: NO PAIN (0)

## 2024-08-16 NOTE — PROGRESS NOTES
Preventive Care Visit  [facility]  Tati Oakley PA-C, Family Medicine  Aug 16, 2024      Assessment & Plan     (Z00.00) Routine general medical examination at a health care facility  (primary encounter diagnosis)  Comment:   Plan:     (Z86.711) History of pulmonary embolism  Comment:   Plan: resolved     Patient has been advised of split billing requirements and indicates understanding: Yes        Counseling  Appropriate preventive services were addressed with this patient via screening, questionnaire, or discussion as appropriate for fall prevention, nutrition, physical activity, Tobacco-use cessation, social engagement, weight loss and cognition.  Checklist reviewing preventive services available has been given to the patient.  Reviewed patient's diet, addressing concerns and/or questions.   She is at risk for lack of exercise and has been provided with information to increase physical activity for the benefit of her well-being.   She is at risk for psychosocial distress and has been provided with information to reduce risk.       See Patient Instructions    Arjun Jones is a 64 year old, presenting for the following:  Physical (Preventative adult)        8/16/2024    12:36 PM   Additional Questions   Roomed by Elva Julian MA Student        Health Care Directive  Patient has a Health Care Directive on file  Advance care planning document is on file and is current.    HPI      Annual Wellness Visit     Patient has been advised of split billing requirements and indicates understanding: Yes      Health Care Directive  Patient has a Health Care Directive on file  Advance care planning document is on file and is current.      8/12/2024   General Health   How would you rate your overall physical health? Good   Feel stress (tense, anxious, or unable to sleep) Only a little             8/12/2024   Nutrition   Diet: Carbohydrate counting            8/12/2024   Exercise   Days per  week of moderate/strenous exercise 3 days   Average minutes spent exercising at this level 20 min              8/12/2024   Social Factors   Frequency of gathering with friends or relatives Once a week   Worry food won't last until get money to buy more No   Food not last or not have enough money for food? No   Do you have housing? (Housing is defined as stable permanent housing and does not include staying ouside in a car, in a tent, in an abandoned building, in an overnight shelter, or couch-surfing.) Yes   Are you worried about losing your housing? No   Lack of transportation? No   Unable to get utilities (heat,electricity)? No              8/12/2024   Fall Risk   Fallen 2 or more times in the past year? No   Trouble with walking or balance? No              No data to display                  8/12/2024   Dental   Dentist two times every year? Yes             No data to display                     No data to display                  8/12/2024   TB Screening   Were you born outside of the [place]? No          Social History     Tobacco Use    Smoking status: Former     Current packs/day: 1.00     Average packs/day: 1 pack/day for 30.0 years (30.0 ttl pk-yrs)     Types: Cigarettes    Smokeless tobacco: Never   Vaping Use    Vaping status: Never Used   Substance Use Topics    Alcohol use: No    Drug use: No         Today's PHQ-2 Score:       8/15/2024     4:14 PM   PHQ-2 ( 1999 [employer])   Q1: Little interest or pleasure in doing things 0   Q2: Feeling down, depressed or hopeless 0   PHQ-2 Score 0   Q1: Little interest or pleasure in doing things Not at all   Q2: Feeling down, depressed or hopeless Not at all   PHQ-2 Score 0           8/8/2024   LAST FHS-7 RESULTS   1st degree relative breast or ovarian cancer No   Any relative bilateral breast cancer No   Any male have breast cancer No   Any ONE woman have BOTH breast AND ovarian cancer No   Any woman with breast cancer before 50yrs No   2 or more relatives with breast  AND/OR ovarian cancer No   2 or more relatives with breast AND/OR bowel cancer No           Mammogram Screening - Mammogram every 1-2 years updated in Health Maintenance based on mutual decision making        8/12/2024   STI Screening   New sexual partner(s) since last STI/HIV test? No        History of abnormal Pap smear: No - age 65 or older with adequate negative prior screening test results (3 consecutive negative cytology results, 2 consecutive negative cotesting results, or 2 consecutive negative HrHPV test results within 10 years, with the most recent test occurring within the recommended screening interval for the test used)        10/8/2010    12:00 AM 9/1/2009    12:00 AM 8/4/2008    12:00 AM   PAP / HPV   PAP (Historical) NIL  NIL  NIL      ASCVD Risk   The 10-year ASCVD risk score (Jeet GAMEZ, et al., 2019) is: 5.2%    Values used to calculate the score:      Age: 64 years      Sex: Female      Is Non- : No      Diabetic: No      Tobacco smoker: No      Systolic Blood Pressure: 130 mmHg      Is BP treated: No      HDL Cholesterol: 61 mg/dL      Total Cholesterol: 217 mg/dL             Reviewed and updated as needed this visit by Provider                    Past Medical History:   Diagnosis Date    Endometriosis of pelvic peritoneum     Hyperlipidemia     Pneumonia due to 2019 novel coronavirus 09/2021    Only received 1 of 2 vaccination doses at time of diagnosis    Pulmonary embolism        Current providers sharing in care for this patient include:  Patient Care Team:  Tati Oakley PA-C as PCP - General (Family Practice)  Tati Oakley PA-C as Assigned PCP    The following health maintenance items are reviewed in Epic and correct as of today:  Health Maintenance   Topic Date Due    RSV VACCINE (Pregnancy & 60+) (1 - 1-dose 60+ series) Never done    LUNG CANCER SCREENING  12/16/2022    COVID-19 Vaccine (2 - 2023-24 season) 09/01/2023    YEARLY PREVENTIVE VISIT   05/30/2024    INFLUENZA VACCINE (1) 09/01/2024    ADVANCE CARE PLANNING  12/19/2024    MAMMO SCREENING  08/08/2025    ANNUAL REVIEW OF HM ORDERS  08/13/2025    LIPID  08/15/2025    GLUCOSE  08/15/2027    DTAP/TDAP/TD IMMUNIZATION (3 - Td or Tdap) 12/09/2030    COLORECTAL CANCER SCREENING  05/06/2032    HEPATITIS C SCREENING  Completed    HIV SCREENING  Completed    PHQ-2 (once per calendar year)  Completed    ZOSTER IMMUNIZATION  Completed    Pneumococcal Vaccine: Pediatrics (0 to 5 Years) and At-Risk Patients (6 to 64 Years)  Aged Out    IPV IMMUNIZATION  Aged Out    HPV IMMUNIZATION  Aged Out    MENINGITIS IMMUNIZATION  Aged Out    RSV MONOCLONAL ANTIBODY  Aged Out    PAP  Discontinued       Appropriate preventive services were discussed with this patient, including applicable screening as appropriate for fall prevention, nutrition, physical activity, Tobacco-use cessation, weight loss and cognition.  Checklist reviewing preventive services available has been given to the patient.              8/12/2024   General Health   How would you rate your overall physical health? Good   Feel stress (tense, anxious, or unable to sleep) Only a little      (!) STRESS CONCERN      8/12/2024   Nutrition   Three or more servings of calcium each day? (!) I DON'T KNOW   Diet: Carbohydrate counting   How many servings of fruit and vegetables per day? (!) 2-3   How many sweetened beverages each day? 0-1            8/12/2024   Exercise   Days per week of moderate/strenous exercise 3 days   Average minutes spent exercising at this level 20 min            8/12/2024   Social Factors   Frequency of gathering with friends or relatives Once a week   Worry food won't last until get money to buy more No   Food not last or not have enough money for food? No   Do you have housing? (Housing is defined as stable permanent housing and does not include staying ouside in a car, in a tent, in an abandoned building, in an overnight shelter, or  couch-surfing.) Yes   Are you worried about losing your housing? No   Lack of transportation? No   Unable to get utilities (heat,electricity)? No            8/12/2024   Fall Risk   Fallen 2 or more times in the past year? No   Trouble with walking or balance? No             8/12/2024   Dental   Dentist two times every year? Yes            8/12/2024   TB Screening   Were you born outside of the [place]? No            Today's PHQ-2 Score:       8/15/2024     4:14 PM   PHQ-2 ( 1999 Pfizer)   Q1: Little interest or pleasure in doing things 0   Q2: Feeling down, depressed or hopeless 0   PHQ-2 Score 0   Q1: Little interest or pleasure in doing things Not at all   Q2: Feeling down, depressed or hopeless Not at all   PHQ-2 Score 0           8/12/2024   Substance Use   Alcohol more than 3/day or more than 7/wk Not Applicable   Do you use any other substances recreationally? No        Social History     Tobacco Use    Smoking status: Former     Current packs/day: 1.00     Average packs/day: 1 pack/day for 30.0 years (30.0 ttl pk-yrs)     Types: Cigarettes    Smokeless tobacco: Never   Vaping Use    Vaping status: Never Used   Substance Use Topics    Alcohol use: No    Drug use: No           8/8/2024   LAST FHS-7 RESULTS   1st degree relative breast or ovarian cancer No   Any relative bilateral breast cancer No   Any male have breast cancer No   Any ONE woman have BOTH breast AND ovarian cancer No   Any woman with breast cancer before 50yrs No   2 or more relatives with breast AND/OR ovarian cancer No   2 or more relatives with breast AND/OR bowel cancer No                   8/12/2024   STI Screening   New sexual partner(s) since last STI/HIV test? No        History of abnormal Pap smear: No - age 30- 64 PAP with HPV every 5 years recommended        10/8/2010    12:00 AM 9/1/2009    12:00 AM 8/4/2008    12:00 AM   PAP / HPV   PAP (Historical) NIL  NIL  NIL      ASCVD Risk   The 10-year ASCVD risk score (Jeet GAMEZ, et al.,  "2019) is: 5.2%    Values used to calculate the score:      Age: 64 years      Sex: Female      Is Non- : No      Diabetic: No      Tobacco smoker: No      Systolic Blood Pressure: 130 mmHg      Is BP treated: No      HDL Cholesterol: 61 mg/dL      Total Cholesterol: 217 mg/dL           Reviewed and updated as needed this visit by Provider                    Past Medical History:   Diagnosis Date    Endometriosis of pelvic peritoneum     Hyperlipidemia     Pneumonia due to 2019 novel coronavirus 09/2021    Only received 1 of 2 vaccination doses at time of diagnosis    Pulmonary embolism          Review of Systems  Constitutional, neuro, ENT, endocrine, pulmonary, cardiac, gastrointestinal, genitourinary, musculoskeletal, integument and psychiatric systems are negative, except as otherwise noted.     Objective    Exam  /82 (BP Location: Right arm, Patient Position: Sitting, Cuff Size: Adult Regular)   Pulse 69   Temp 98  F (36.7  C) (Temporal)   Resp 16   Ht 1.689 m (5' 6.5\")   Wt 68.6 kg (151 lb 3.2 oz)   LMP 06/01/2006   SpO2 98%   BMI 24.04 kg/m     Estimated body mass index is 24.04 kg/m  as calculated from the following:    Height as of this encounter: 1.689 m (5' 6.5\").    Weight as of this encounter: 68.6 kg (151 lb 3.2 oz).    Physical Exam  GENERAL: alert and no distress  EYES: Eyes grossly normal to inspection, PERRL and conjunctivae and sclerae normal  HENT: ear canals and TM's normal, nose and mouth without ulcers or lesions  NECK: no adenopathy, no asymmetry, masses, or scars  RESP: lungs clear to auscultation - no rales, rhonchi or wheezes  BREAST: normal without masses, tenderness or nipple discharge and no palpable axillary masses or adenopathy  CV: regular rate and rhythm, normal S1 S2, no S3 or S4, no murmur, click or rub, no peripheral edema  ABDOMEN: soft, nontender, no hepatosplenomegaly, no masses and bowel sounds normal  MS: no gross musculoskeletal defects " noted, no edema  SKIN: no suspicious lesions or rashes  NEURO: Normal strength and tone, mentation intact and speech normal  PSYCH: mentation appears normal, affect normal/bright        Signed Electronically by: Tati Oakley PA-C

## 2024-08-16 NOTE — PATIENT INSTRUCTIONS
Patient Education   Preventive Care Advice   This is general advice given by our system to help you stay healthy. However, your care team may have specific advice just for you. Please talk to your care team about your preventive care needs.  Nutrition  Eat 5 or more servings of fruits and vegetables each day.  Try wheat bread, brown rice and whole grain pasta (instead of white bread, rice, and pasta).  Get enough calcium and vitamin D. Check the label on foods and aim for 100% of the RDA (recommended daily allowance).  Lifestyle  Exercise at least 150 minutes each week  (30 minutes a day, 5 days a week).  Do muscle strengthening activities 2 days a week. These help control your weight and prevent disease.  No smoking.  Wear sunscreen to prevent skin cancer.  Have a dental exam and cleaning every 6 months.  Yearly exams  See your health care team every year to talk about:  Any changes in your health.  Any medicines your care team has prescribed.  Preventive care, family planning, and ways to prevent chronic diseases.  Shots (vaccines)   HPV shots (up to age 26), if you've never had them before.  Hepatitis B shots (up to age 59), if you've never had them before.  COVID-19 shot: Get this shot when it's due.  Flu shot: Get a flu shot every year.  Tetanus shot: Get a tetanus shot every 10 years.  Pneumococcal, hepatitis A, and RSV shots: Ask your care team if you need these based on your risk.  Shingles shot (for age 50 and up)  General health tests  Diabetes screening:  Starting at age 35, Get screened for diabetes at least every 3 years.  If you are younger than age 35, ask your care team if you should be screened for diabetes.  Cholesterol test: At age 39, start having a cholesterol test every 5 years, or more often if advised.  Bone density scan (DEXA): At age 50, ask your care team if you should have this scan for osteoporosis (brittle bones).  Hepatitis C: Get tested at least once in your life.  STIs (sexually  transmitted infections)  Before age 24: Ask your care team if you should be screened for STIs.  After age 24: Get screened for STIs if you're at risk. You are at risk for STIs (including HIV) if:  You are sexually active with more than one person.  You don't use condoms every time.  You or a partner was diagnosed with a sexually transmitted infection.  If you are at risk for HIV, ask about PrEP medicine to prevent HIV.  Get tested for HIV at least once in your life, whether you are at risk for HIV or not.  Cancer screening tests  Cervical cancer screening: If you have a cervix, begin getting regular cervical cancer screening tests starting at age 21.  Breast cancer scan (mammogram): If you've ever had breasts, begin having regular mammograms starting at age 40. This is a scan to check for breast cancer.  Colon cancer screening: It is important to start screening for colon cancer at age 45.  Have a colonoscopy test every 10 years (or more often if you're at risk) Or, ask your provider about stool tests like a FIT test every year or Cologuard test every 3 years.  To learn more about your testing options, visit:   .  For help making a decision, visit:   https://bit.ly/po43833.  Prostate cancer screening test: If you have a prostate, ask your care team if a prostate cancer screening test (PSA) at age 55 is right for you.  Lung cancer screening: If you are a current or former smoker ages 50 to 80, ask your care team if ongoing lung cancer screenings are right for you.  For informational purposes only. Not to replace the advice of your health care provider. Copyright   2023 [employer]. All rights reserved. Clinically reviewed by the [facility] Transitions Program. YUPIQ [id number] - REV 01/24.

## 2025-07-17 ENCOUNTER — PATIENT OUTREACH (OUTPATIENT)
Dept: CARE COORDINATION | Facility: CLINIC | Age: 65
End: 2025-07-17
Payer: COMMERCIAL

## 2025-07-21 ENCOUNTER — PATIENT OUTREACH (OUTPATIENT)
Dept: CARE COORDINATION | Facility: CLINIC | Age: 65
End: 2025-07-21
Payer: COMMERCIAL

## (undated) DEVICE — KIT ENDO TURNOVER/PROCEDURE W/CLEAN A SCOPE LINERS 103888

## (undated) RX ORDER — FENTANYL CITRATE 0.05 MG/ML
INJECTION, SOLUTION INTRAMUSCULAR; INTRAVENOUS
Status: DISPENSED
Start: 2022-05-06